# Patient Record
Sex: FEMALE | Race: WHITE | NOT HISPANIC OR LATINO | Employment: OTHER | URBAN - METROPOLITAN AREA
[De-identification: names, ages, dates, MRNs, and addresses within clinical notes are randomized per-mention and may not be internally consistent; named-entity substitution may affect disease eponyms.]

---

## 2020-04-24 ENCOUNTER — LAB REQUISITION (OUTPATIENT)
Dept: LAB | Facility: HOSPITAL | Age: 85
End: 2020-04-24

## 2020-04-24 DIAGNOSIS — U07.1 COVID-19: ICD-10-CM

## 2020-04-24 LAB — SARS-COV-2 RNA RESP QL NAA+PROBE: POSITIVE

## 2020-04-24 PROCEDURE — 87635 SARS-COV-2 COVID-19 AMP PRB: CPT | Performed by: INTERNAL MEDICINE

## 2020-07-13 ENCOUNTER — NURSING HOME VISIT (OUTPATIENT)
Dept: GERIATRICS | Facility: OTHER | Age: 85
End: 2020-07-13
Payer: MEDICARE

## 2020-07-13 VITALS
DIASTOLIC BLOOD PRESSURE: 60 MMHG | WEIGHT: 168 LBS | TEMPERATURE: 98.1 F | HEART RATE: 94 BPM | SYSTOLIC BLOOD PRESSURE: 122 MMHG | OXYGEN SATURATION: 98 % | RESPIRATION RATE: 16 BRPM

## 2020-07-13 DIAGNOSIS — I50.42 CHRONIC COMBINED SYSTOLIC AND DIASTOLIC CONGESTIVE HEART FAILURE (HCC): Primary | ICD-10-CM

## 2020-07-13 DIAGNOSIS — N18.30 STAGE 3 CHRONIC KIDNEY DISEASE (HCC): ICD-10-CM

## 2020-07-13 PROBLEM — K21.9 GERD (GASTROESOPHAGEAL REFLUX DISEASE): Status: ACTIVE | Noted: 2020-07-13

## 2020-07-13 PROBLEM — I10 HTN (HYPERTENSION): Status: ACTIVE | Noted: 2020-07-13

## 2020-07-13 PROBLEM — I89.0 LYMPHEDEMA: Status: ACTIVE | Noted: 2020-07-13

## 2020-07-13 PROCEDURE — 99308 SBSQ NF CARE LOW MDM 20: CPT | Performed by: PHYSICIAN ASSISTANT

## 2020-07-13 RX ORDER — OMEPRAZOLE 20 MG/1
20 CAPSULE, DELAYED RELEASE ORAL DAILY
COMMUNITY

## 2020-07-13 RX ORDER — FUROSEMIDE 40 MG/1
40 TABLET ORAL 2 TIMES DAILY
COMMUNITY
End: 2020-09-23 | Stop reason: HOSPADM

## 2020-07-13 RX ORDER — BUMETANIDE 1 MG/1
1 TABLET ORAL 2 TIMES DAILY
Status: ON HOLD | COMMUNITY
End: 2020-09-23 | Stop reason: SDUPTHER

## 2020-07-13 NOTE — PROGRESS NOTES
[facility]  [address]  [phone]  [place]   Code 32        NAME: Louann Bowers  AGE: 80 y o  SEX: female    DATE OF ENCOUNTER: 7/13/2020     CODE STATUS: FULL CODE       Assessment and Plan     Chronic combined systolic and diastolic congestive heart failure (HCC)  Wt Readings from Last 3 Encounters:   07/13/20 76 2 kg (168 lb)   10/19/16 99 8 kg (220 lb)         - BLE edema 3+  - Continue metoprolol   - Increase furosemide to 60 mg PO BID x 3 days, then return to maintenance dose of 40 mg PO BID    - Add Bumex 1mg PO QD x 3 days then increase to BID  - BMP on 7/14/2020  - BMP on 7/17/2020   - Continue to elevate BLE  - PT not willing to do ACE wraps at this time, states they are uncomfortable  - Continue low sodium diet  - Monitor weights at facility       Stage 3 chronic kidney disease ([facility] )  - Last CMP January of 2020   - BMP on 7/14/2020   - BMP on 7/17/2020   - concern for worsening kidney function while on increased dose of lasix       Plan discussed with Dr Shreya Albarran noted agreement with assessment and plan  All medications and routine orders were reviewed and updated as needed  Chief Complaint     BLE edema -- Nursing request to evaluate    History of Present Illness     BM is an 81 yo CF with multiple medical comorbidities including but not limited to CHF and CKD stage 3 interviewed and examined in collaboration with nursing for worsening BLE edema  Pt with worsening BLE edema  She states her BLE are chronically "big" from lymphedema but the swelling is worsened  She notes medication regimen compliance, compliance with low sodium diet, and compliance with elevating BLE  She refuses ACE wraps to BLE due to being uncomfortable, "especially when they are this big " No other concerns from nursing  Congestive Heart Failure   Presents for follow-up visit  Associated symptoms include edema and orthopnea   Pertinent negatives include no abdominal pain, chest pain, palpitations or shortness of breath  The symptoms have been worsening (BLE edema 3+-4+)  The pain is at a severity of 0/10  Compliance with total regimen is %  Compliance with diet is %  Compliance with medications is %  The patient's allergies, past medical, surgical, social and family history were reviewed and unchanged  Review of Systems     Review of Systems   Constitutional: Positive for activity change  Negative for appetite change, chills and fever  Respiratory: Negative for cough, chest tightness and shortness of breath  Cardiovascular: Positive for leg swelling  Negative for chest pain and palpitations  Gastrointestinal: Negative for abdominal pain  Neurological: Positive for weakness  Psychiatric/Behavioral: Negative for suicidal ideas  Objective     Vitals:   Vitals:    07/13/20 1537   BP: 122/60   Pulse: 94   Resp: 16   Temp: 98 1 °F (36 7 °C)   SpO2: 98%         Physical Exam   Constitutional: No distress  Elderly, chronically ill appearing female resting comfortably in bed with BLE elevated   Eyes:   Wearing glasses   Cardiovascular: Normal rate and regular rhythm  Exam reveals no gallop and no friction rub  No murmur heard  Pulmonary/Chest: Effort normal and breath sounds normal  No stridor  No respiratory distress  She has no wheezes  She has no rales  Musculoskeletal: She exhibits edema (3-4+ pitting edema BLE ) and tenderness ("in my legs from being so big")  Neurological: She is alert  Skin: She is not diaphoretic  Psychiatric:   Pleasant and cooperative during exam    Nursing note and vitals reviewed  Pertinent Laboratory/Diagnostic Studies:  Reviewed in facility chart     Current Medications   Medications reviewed and updated see facility [facility] for details        Current Outpatient Medications:     apixaban (ELIQUIS) 2 5 mg, Take 2 5 mg by mouth 2 (two) times a day, Disp: , Rfl:     bumetanide (BUMEX) 1 mg tablet, Take 1 mg by mouth 2 (two) times a day Take 1 mg QD x 3 days while on increase dose of lasix THEN take 1 tab PO BID indefinitely, Disp: , Rfl:     furosemide (LASIX) 40 mg tablet, Take 40 mg by mouth 2 (two) times a day, Disp: , Rfl:     omeprazole (PriLOSEC) 20 mg delayed release capsule, Take 20 mg by mouth daily, Disp: , Rfl:     metoprolol tartrate (LOPRESSOR) 50 mg tablet, Take 50 mg by mouth 2 (two) times a day, Disp: , Rfl:       Ivonne Grande PA-C  7/13/2020 4:08 PM

## 2020-07-31 ENCOUNTER — NURSING HOME VISIT (OUTPATIENT)
Dept: GERIATRICS | Facility: OTHER | Age: 85
End: 2020-07-31
Payer: MEDICARE

## 2020-07-31 VITALS
HEART RATE: 88 BPM | DIASTOLIC BLOOD PRESSURE: 66 MMHG | SYSTOLIC BLOOD PRESSURE: 122 MMHG | TEMPERATURE: 98.2 F | RESPIRATION RATE: 16 BRPM | WEIGHT: 168.2 LBS | OXYGEN SATURATION: 97 %

## 2020-07-31 DIAGNOSIS — I50.42 CHRONIC COMBINED SYSTOLIC AND DIASTOLIC CONGESTIVE HEART FAILURE (HCC): Primary | ICD-10-CM

## 2020-07-31 PROCEDURE — 99308 SBSQ NF CARE LOW MDM 20: CPT | Performed by: PHYSICIAN ASSISTANT

## 2020-07-31 NOTE — PROGRESS NOTES
[facility]  [address]  [phone]  [place]   Code 32         NAME: Narcisa Gamboa  AGE: 80 y o  SEX: female    DATE OF ENCOUNTER: 7/31/2020    Assessment and Plan     Chronic combined systolic and diastolic congestive heart failure (HCC)  Wt Readings from Last 3 Encounters:   07/13/20 76 2 kg (168 lb)   10/19/16 99 8 kg (220 lb)         - BLE 3+   - Increase lasix to 60mg PO BID x 3 days, then decrease back to maintenance dose of 40 mg PO BID   - Continue Bumex 1mg PO BID   - Continue fluid restriction 1800mL daily   - Continue low sodium diet at facility    -Continue to encourage to elevate BLE as much as possible   - Continue to encourage ACE wraps daily, on in AM, off in PM  - BMP on 8/3/2020        All medications and routine orders were reviewed and updated as needed  Chief Complaint     Nursing request to evaluate for BLE edema    History of Present Illness     BM is an 81 yo CF with multiple medical comorbidities including but not limited to CHF interviewed and examined in collaboration with nursing for worsening BLE edema  Congestive Heart Failure   Presents for follow-up visit  Associated symptoms include edema and orthopnea  Pertinent negatives include no abdominal pain, chest pain, chest pressure or shortness of breath  The symptoms have been worsening  Compliance with total regimen is %  Compliance problems: pt pockets medications if someone does not watch her swallow medication per nursing  Compliance with diet is %  Compliance with exercise: bed bound  The patient's allergies, past medical, surgical, social and family history were reviewed and unchanged  Review of Systems     Review of Systems   Respiratory: Negative for cough and shortness of breath  Cardiovascular: Positive for leg swelling  Negative for chest pain  Gastrointestinal: Negative for abdominal pain  Psychiatric/Behavioral: Negative for suicidal ideas  Objective     Vitals:   Vitals:    07/31/20 1603   BP: 122/66   Pulse: 88   Resp: 16   Temp: 98 2 °F (36 8 °C)   SpO2: 97%         Physical Exam   Constitutional: No distress  Elderly female lying comfortably in bed, BLE slightly elevated on a pillow    Cardiovascular: Normal rate and regular rhythm  Exam reveals no gallop and no friction rub  No murmur heard  Pulmonary/Chest: Effort normal and breath sounds normal  No stridor  No respiratory distress  She has no wheezes  She has no rales  Musculoskeletal: She exhibits edema (3+ BLE )  BLE wrapped in ACE wraps    Neurological: She is alert  Skin: She is not diaphoretic  Psychiatric:   Pleasant and cooperative during exam    Nursing note and vitals reviewed  Pertinent Laboratory/Diagnostic Studies:  0/08/2944  BASIC METABOLIC PNL  GLUCOSE 77 mg/dL 65-99 Final  BUN 40 mg/dL 7-25 H Final  CREATININE 1 47 mg/dL 0 40-1 10 H Final  SODIUM 138 mmol/L 135-145 Final  POTASSIUM 3 4 mmol/L 3 5-5 2 L Final  CHLORIDE 98 mmol/L 100-109 L Final  CARBON DIOXIDE 34 mmol/L 23-31 H Final  CALCIUM 9 2 mg/dL 8 5-10 1 Final  ANION GAP 6 3-11 Final  GFR, CALCULATED 32 >60 L Final    Current Medications   Medications reviewed and updated see facility [facility] for details        Current Outpatient Medications:     apixaban (ELIQUIS) 2 5 mg, Take 2 5 mg by mouth 2 (two) times a day, Disp: , Rfl:     bumetanide (BUMEX) 1 mg tablet, Take 1 mg by mouth 2 (two) times a day Take 1 mg QD x 3 days while on increase dose of lasix THEN take 1 tab PO BID indefinitely, Disp: , Rfl:     furosemide (LASIX) 40 mg tablet, Take 40 mg by mouth 2 (two) times a day, Disp: , Rfl:     metoprolol tartrate (LOPRESSOR) 50 mg tablet, Take 50 mg by mouth 2 (two) times a day, Disp: , Rfl:     omeprazole (PriLOSEC) 20 mg delayed release capsule, Take 20 mg by mouth daily, Disp: , Rfl:       Liam Vale PA-C  7/31/2020 4:03 PM

## 2020-07-31 NOTE — ASSESSMENT & PLAN NOTE
Wt Readings from Last 3 Encounters:   07/13/20 76 2 kg (168 lb)   10/19/16 99 8 kg (220 lb)         - BLE 3+   - Increase lasix to 60mg PO BID x 3 days, then decrease back to maintenance dose of 40 mg PO BID   - Continue Bumex 1mg PO BID   - Continue fluid restriction 1800mL daily   - Continue low sodium diet at facility    -Continue to encourage to elevate BLE as much as possible   - Continue to encourage ACE wraps daily, on in AM, off in PM  - BMP on 8/3/2020

## 2020-09-03 ENCOUNTER — NURSING HOME VISIT (OUTPATIENT)
Dept: GERIATRICS | Facility: OTHER | Age: 85
End: 2020-09-03
Payer: COMMERCIAL

## 2020-09-03 DIAGNOSIS — I89.0 LYMPHEDEMA: ICD-10-CM

## 2020-09-03 DIAGNOSIS — I50.42 CHRONIC COMBINED SYSTOLIC AND DIASTOLIC CONGESTIVE HEART FAILURE (HCC): Primary | ICD-10-CM

## 2020-09-03 DIAGNOSIS — I48.91 ATRIAL FIBRILLATION, UNSPECIFIED TYPE (HCC): ICD-10-CM

## 2020-09-03 DIAGNOSIS — I10 HYPERTENSION, UNSPECIFIED TYPE: ICD-10-CM

## 2020-09-03 DIAGNOSIS — N18.30 STAGE 3 CHRONIC KIDNEY DISEASE (HCC): ICD-10-CM

## 2020-09-03 PROCEDURE — 99309 SBSQ NF CARE MODERATE MDM 30: CPT | Performed by: NURSE PRACTITIONER

## 2020-09-03 NOTE — ASSESSMENT & PLAN NOTE
Wt Readings from Last 3 Encounters:   07/31/20 76 3 kg (168 lb 3 2 oz)   07/13/20 76 2 kg (168 lb)   10/19/16 99 8 kg (220 lb)     Weight loss: 5lbs x 1 month  Current weight: 74 18 kgs (8/12/2020)  No cardiopulmonary symptoms  Chronic lymphedema  Renal functions trending up (9/2/2020)  Continue monthly weight  Continue the following meds:  * Metolazone 2 5mg weekly  * Bumex 2mg BID  * Metoprolol tartrate 50mg BID  Continue BMP as scheduled  Continue oral fluid restriction: 1,800ml/day  Continue low sodium, cardiac diet

## 2020-09-03 NOTE — ASSESSMENT & PLAN NOTE
Patient non-compliant to compression wrap  Per nursing, at baseline  Doppler study done on 8/25/2020: Negative  XRAY done: Negative (please see labs/imaging section)  Nursing to continue to monitor and elevate foot of bed at all times

## 2020-09-03 NOTE — PROGRESS NOTES
Progress Note    Location: [place]  POS: 32 ([facility])    Assessment/Plan:    Chronic combined systolic and diastolic congestive heart failure (HCC)  Wt Readings from Last 3 Encounters:   07/31/20 76 3 kg (168 lb 3 2 oz)   07/13/20 76 2 kg (168 lb)   10/19/16 99 8 kg (220 lb)     Weight loss: 5lbs x 1 month  Current weight: 74 18 kgs (8/12/2020)  No cardiopulmonary symptoms  Chronic lymphedema  Renal functions trending up (9/2/2020)  Continue monthly weight  Continue the following meds:  * Metolazone 2 5mg weekly  * Bumex 2mg BID  * Metoprolol tartrate 50mg BID  Continue BMP as scheduled  Continue oral fluid restriction: 1,800ml/day  Continue low sodium, cardiac diet  Stage 3 chronic kidney disease (HCC)  Trending up renal functions (BUN, Crea and lower GFR)  On diuretics  On fluid restrictions: 1,800 ml/day  Followed by Nephrology office  Continue to serially monitor BMP as scheduled  HTN (hypertension)  Stable and controlled  On weekly BP and HR checks only  BP range (8/4 - 25/2020) = 110/51 to 132/70  Trending up renal functions (9/2/2020)  Continue the following meds:  * Metolazone 2 5mg weekly  * Bumex 2mg BID  * Metoprolol tartrate 50mg BID  Continue to serially monitor BMP as scheduled  Lymphedema  Patient non-compliant to compression wrap  Per nursing, at baseline  Doppler study done on 8/25/2020: Negative  XRAY done: Negative (please see labs/imaging section)  Nursing to continue to monitor and elevate foot of bed at all times  Atrial fibrillation ([address] )  Per [facility] epic note, 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)  On long term anticoagulation (since 2018)  BP and HR stable and controlled  Continue Apixaban 2 5mg Q12 hours      Chief complaint / Reason for visit: Follow-up visit    History of Present Illness: This is an 80 y o  Female patient admitted at [facility] for debility   Patient is seen and examined today to follow-up acute and chronic medical conditions: HTN, Chronic Combined CHF, CKD3, GERD and Lymphedema and Atrial Fibrillation    Patient is in bed for this visit - alert, cooperative and not in distress  Verbal with coherent speech - oriented to name/birthday/ place and year only but able to recall days of the week (forward/backward) and count 1-20 without problems or need for cuing  Noted significant hearing impairment  Patient denies any acute medical concerns for this visit - including new or worsening pain, chest pain, SOB, fever, chills, headache, malaise/fatigue, dizziness/ vertigo, cough/colds symptoms or appetite/sleep concerns  Per nursing, patient's leg looks increasing in size for the last 3 days  Patient declines compression wraps and ended up not using it all together  On examination, noted large ecchymotic area to left calf and a small one on Left dorsum of foot - patient reported pain when left foot discoloration is gently assessed  on this visit - per nursing, the discoloration was already observed when she got transferred to current room 3 days ago  Otherwise, the rest of system assessment unremarkable  Presented to be medically stable on this visit  V/S: T97 3F -P80 -R18 BP: 138/64 SpO2: 96% RA  Review of Systems:  Per history of present illness, all other systems reviewed and negative  HISTORY:  Medical Hx: Reviewed, unchanged  Family Hx: Reviewed, unchanged  Soc Hx: Reviewed,  Unchanged    ALLERGY: No Known Allergies    PHYSICAL EXAM:  Vital Signs:  T97 0F -P78 -R17 BP: 128/67 SpO2: 96% RA  Weight: 163 2 lbs (8/12/2020) <= 168 2 lbs (7/29/2020) <= 150 0 lbs (6/1/2020)      Physical Exam  Vitals signs and nursing note reviewed  Constitutional:       General: She is not in acute distress  Appearance: Normal appearance  She is normal weight  She is not ill-appearing, toxic-appearing or diaphoretic  HENT:      Head: Normocephalic and atraumatic  Right Ear: Tympanic membrane, ear canal and external ear normal  There is no impacted cerumen  Left Ear: Tympanic membrane, ear canal and external ear normal  There is no impacted cerumen  Nose: Nose normal  No congestion or rhinorrhea  Mouth/Throat:      Mouth: Mucous membranes are moist       Pharynx: Oropharynx is clear  No oropharyngeal exudate or posterior oropharyngeal erythema  Eyes:      General: No scleral icterus  Right eye: No discharge  Left eye: No discharge  Conjunctiva/sclera: Conjunctivae normal       Pupils: Pupils are equal, round, and reactive to light  Comments: Wears glasses  Neck:      Musculoskeletal: Neck supple  No neck rigidity or muscular tenderness  Vascular: No carotid bruit  Cardiovascular:      Rate and Rhythm: Normal rate and regular rhythm  Heart sounds: Normal heart sounds  No murmur  No friction rub  No gallop  Pulmonary:      Effort: Pulmonary effort is normal  No respiratory distress  Breath sounds: Normal breath sounds  No stridor  No wheezing, rhonchi or rales  Chest:      Chest wall: No tenderness  Abdominal:      General: Bowel sounds are normal  There is no distension  Palpations: Abdomen is soft  There is no mass  Tenderness: There is no abdominal tenderness  There is no right CVA tenderness, left CVA tenderness, guarding or rebound  Hernia: No hernia is present  Genitourinary:     Comments: Non distended bladder  Musculoskeletal:         General: Swelling and tenderness present  No deformity or signs of injury  Right lower leg: Edema present  Left lower leg: Edema present  Comments: Non ambulatory - bed bound  Large ecchymotic area to left calf and a small one on Left dorsum of foot - patient reported pain when left foot discoloration is gently assessed    Lymphadenopathy:      Cervical: No cervical adenopathy  Skin:     General: Skin is warm  Coloration: Skin is not jaundiced or pale  Findings: Bruising present  No erythema, lesion or rash        Comments: Large ecchymotic (purplish in color) area to Left calf; Ecchymotic (light green) area  Neurological:      Mental Status: She is alert  Mental status is at baseline  Comments: Verbal with clear coherent speech  Oriented to name/birthday/ place and year only but able to recall days of the week (forward/backward) and count 1-20 without problems or need for cuing  Psychiatric:         Mood and Affect: Mood normal          Behavior: Behavior normal        Laboratory results / Imaging reviewed: Hard copies in medical chart:    * BMP (9/2/2020) = WNL except:  BUN: 53 (H)  Crea: 1 71 (H)  K: 3 4 (L)  Cl: 95 (L)  Co: 38  GFR: 26 (L)    * XRAY (8/25/2020)  Left foot: soft tissue swelling  No fracture or dislocation  Osteopenic  Left Tibia/Fibula: No fracture  Left knee: No fracture  DJD  Osteopenia      Current Medications:   All medications reviewed and updated in [address] [place] [address] [id number]  9/3/2020

## 2020-09-03 NOTE — ASSESSMENT & PLAN NOTE
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

## 2020-09-03 NOTE — ASSESSMENT & PLAN NOTE
Stable and controlled  On weekly BP and HR checks only  BP range (8/4 - 25/2020) = 110/51 to 132/70  Trending up renal functions (9/2/2020)  Continue the following meds:  * Metolazone 2 5mg weekly  * Bumex 2mg BID  * Metoprolol tartrate 50mg BID  Continue to serially monitor BMP as scheduled

## 2020-09-03 NOTE — ASSESSMENT & PLAN NOTE
Trending up renal functions (BUN, Crea and lower GFR)  On diuretics  On fluid restrictions: 1,800 ml/day  Followed by Nephrology office  Continue to serially monitor BMP as scheduled

## 2020-09-09 ENCOUNTER — TELEPHONE (OUTPATIENT)
Dept: OTHER | Facility: OTHER | Age: 85
End: 2020-09-09

## 2020-09-09 NOTE — TELEPHONE ENCOUNTER
Petty @ [facility] [phone]/YH: Glory Mcburney 6/18/1932/Lab Results

## 2020-09-20 ENCOUNTER — TELEPHONE (OUTPATIENT)
Dept: OTHER | Facility: OTHER | Age: 85
End: 2020-09-20

## 2020-09-20 NOTE — TELEPHONE ENCOUNTER
[phone]/CORINNE/[facility] Heather/Mary Anne Manley/[birth year]/Questions concerning residents renal disease

## 2020-09-21 ENCOUNTER — APPOINTMENT (EMERGENCY)
Dept: RADIOLOGY | Facility: HOSPITAL | Age: 85
DRG: 683 | End: 2020-09-21
Payer: COMMERCIAL

## 2020-09-21 ENCOUNTER — HOSPITAL ENCOUNTER (INPATIENT)
Facility: HOSPITAL | Age: 85
LOS: 2 days | Discharge: NURSING, REHAB OR LONG-TERM CARE | DRG: 683 | End: 2020-09-23
Attending: EMERGENCY MEDICINE | Admitting: INTERNAL MEDICINE
Payer: COMMERCIAL

## 2020-09-21 DIAGNOSIS — N17.9 ACUTE KIDNEY INJURY SUPERIMPOSED ON CHRONIC KIDNEY DISEASE (HCC): ICD-10-CM

## 2020-09-21 DIAGNOSIS — N17.9 ACUTE RENAL FAILURE SUPERIMPOSED ON CHRONIC KIDNEY DISEASE, UNSPECIFIED CKD STAGE, UNSPECIFIED ACUTE RENAL FAILURE TYPE (HCC): ICD-10-CM

## 2020-09-21 DIAGNOSIS — F03.90 DEMENTIA (HCC): ICD-10-CM

## 2020-09-21 DIAGNOSIS — D63.1 ANEMIA DUE TO CHRONIC KIDNEY DISEASE: ICD-10-CM

## 2020-09-21 DIAGNOSIS — N18.9 ACUTE RENAL FAILURE SUPERIMPOSED ON CHRONIC KIDNEY DISEASE, UNSPECIFIED CKD STAGE, UNSPECIFIED ACUTE RENAL FAILURE TYPE (HCC): ICD-10-CM

## 2020-09-21 DIAGNOSIS — N18.9 ACUTE KIDNEY INJURY SUPERIMPOSED ON CHRONIC KIDNEY DISEASE (HCC): ICD-10-CM

## 2020-09-21 DIAGNOSIS — N18.9 ANEMIA DUE TO CHRONIC KIDNEY DISEASE: ICD-10-CM

## 2020-09-21 DIAGNOSIS — R53.1 GENERALIZED WEAKNESS: Primary | ICD-10-CM

## 2020-09-21 DIAGNOSIS — E46 MALNUTRITION, UNSPECIFIED TYPE (HCC): ICD-10-CM

## 2020-09-21 DIAGNOSIS — I50.42 CHRONIC COMBINED SYSTOLIC AND DIASTOLIC CONGESTIVE HEART FAILURE (HCC): Chronic | ICD-10-CM

## 2020-09-21 PROBLEM — I50.9 CONGESTIVE HEART FAILURE (CHF) (HCC): Status: ACTIVE | Noted: 2020-09-21

## 2020-09-21 PROBLEM — G93.41 ACUTE METABOLIC ENCEPHALOPATHY: Status: ACTIVE | Noted: 2020-09-21

## 2020-09-21 PROBLEM — E83.52 HYPERCALCEMIA: Status: ACTIVE | Noted: 2020-09-21

## 2020-09-21 PROBLEM — E87.1 HYPONATREMIA: Status: ACTIVE | Noted: 2020-09-21

## 2020-09-21 LAB
ALBUMIN SERPL BCP-MCNC: 2.5 G/DL (ref 3.5–5)
ALP SERPL-CCNC: 157 U/L (ref 46–116)
ALT SERPL W P-5'-P-CCNC: 16 U/L (ref 12–78)
ANION GAP SERPL CALCULATED.3IONS-SCNC: 10 MMOL/L (ref 4–13)
APTT PPP: 39 SECONDS (ref 23–37)
AST SERPL W P-5'-P-CCNC: 55 U/L (ref 5–45)
ATRIAL RATE: 153 BPM
BACTERIA UR QL AUTO: ABNORMAL /HPF
BASOPHILS # BLD AUTO: 0.02 THOUSANDS/ΜL (ref 0–0.1)
BASOPHILS NFR BLD AUTO: 0 % (ref 0–1)
BILIRUB SERPL-MCNC: 0.66 MG/DL (ref 0.2–1)
BILIRUB UR QL STRIP: NEGATIVE
BUN SERPL-MCNC: 96 MG/DL (ref 5–25)
CALCIUM ALBUM COR SERPL-MCNC: 10.6 MG/DL (ref 8.3–10.1)
CALCIUM SERPL-MCNC: 9.4 MG/DL (ref 8.3–10.1)
CHLORIDE SERPL-SCNC: 92 MMOL/L (ref 100–108)
CLARITY UR: CLEAR
CO2 SERPL-SCNC: 31 MMOL/L (ref 21–32)
COLOR UR: YELLOW
CREAT SERPL-MCNC: 2.38 MG/DL (ref 0.6–1.3)
CREAT UR-MCNC: 38 MG/DL
EOSINOPHIL # BLD AUTO: 0.14 THOUSAND/ΜL (ref 0–0.61)
EOSINOPHIL NFR BLD AUTO: 2 % (ref 0–6)
ERYTHROCYTE [DISTWIDTH] IN BLOOD BY AUTOMATED COUNT: 19.4 % (ref 11.6–15.1)
GFR SERPL CREATININE-BSD FRML MDRD: 18 ML/MIN/1.73SQ M
GLUCOSE SERPL-MCNC: 83 MG/DL (ref 65–140)
GLUCOSE UR STRIP-MCNC: NEGATIVE MG/DL
HCT VFR BLD AUTO: 31.1 % (ref 34.8–46.1)
HGB BLD-MCNC: 9.7 G/DL (ref 11.5–15.4)
HGB UR QL STRIP.AUTO: ABNORMAL
IMM GRANULOCYTES # BLD AUTO: 0.01 THOUSAND/UL (ref 0–0.2)
IMM GRANULOCYTES NFR BLD AUTO: 0 % (ref 0–2)
INR PPP: 1.97 (ref 0.84–1.19)
KETONES UR STRIP-MCNC: NEGATIVE MG/DL
LACTATE SERPL-SCNC: 1.2 MMOL/L (ref 0.5–2)
LEUKOCYTE ESTERASE UR QL STRIP: ABNORMAL
LYMPHOCYTES # BLD AUTO: 2.23 THOUSANDS/ΜL (ref 0.6–4.47)
LYMPHOCYTES NFR BLD AUTO: 29 % (ref 14–44)
MCH RBC QN AUTO: 22.7 PG (ref 26.8–34.3)
MCHC RBC AUTO-ENTMCNC: 31.2 G/DL (ref 31.4–37.4)
MCV RBC AUTO: 73 FL (ref 82–98)
MONOCYTES # BLD AUTO: 0.8 THOUSAND/ΜL (ref 0.17–1.22)
MONOCYTES NFR BLD AUTO: 11 % (ref 4–12)
NEUTROPHILS # BLD AUTO: 4.43 THOUSANDS/ΜL (ref 1.85–7.62)
NEUTS SEG NFR BLD AUTO: 58 % (ref 43–75)
NITRITE UR QL STRIP: POSITIVE
NON-SQ EPI CELLS URNS QL MICRO: ABNORMAL /HPF
NRBC BLD AUTO-RTO: 0 /100 WBCS
NT-PROBNP SERPL-MCNC: ABNORMAL PG/ML
PH UR STRIP.AUTO: 5.5 [PH]
PLATELET # BLD AUTO: 403 THOUSANDS/UL (ref 149–390)
PMV BLD AUTO: 10.6 FL (ref 8.9–12.7)
POTASSIUM SERPL-SCNC: 3.6 MMOL/L (ref 3.5–5.3)
PROT SERPL-MCNC: 6.2 G/DL (ref 6.4–8.2)
PROT UR STRIP-MCNC: NEGATIVE MG/DL
PROTHROMBIN TIME: 22.5 SECONDS (ref 11.6–14.5)
QRS AXIS: 43 DEGREES
QRSD INTERVAL: 94 MS
QT INTERVAL: 344 MS
QTC INTERVAL: 411 MS
RBC # BLD AUTO: 4.28 MILLION/UL (ref 3.81–5.12)
RBC #/AREA URNS AUTO: ABNORMAL /HPF
SODIUM 24H UR-SCNC: 40 MOL/L
SODIUM SERPL-SCNC: 133 MMOL/L (ref 136–145)
SP GR UR STRIP.AUTO: 1.01 (ref 1–1.03)
T WAVE AXIS: 158 DEGREES
TROPONIN I SERPL-MCNC: <0.02 NG/ML
UROBILINOGEN UR QL STRIP.AUTO: 0.2 E.U./DL
VENTRICULAR RATE: 86 BPM
WBC # BLD AUTO: 7.63 THOUSAND/UL (ref 4.31–10.16)
WBC #/AREA URNS AUTO: ABNORMAL /HPF

## 2020-09-21 PROCEDURE — 99223 1ST HOSP IP/OBS HIGH 75: CPT | Performed by: INTERNAL MEDICINE

## 2020-09-21 PROCEDURE — 83605 ASSAY OF LACTIC ACID: CPT | Performed by: EMERGENCY MEDICINE

## 2020-09-21 PROCEDURE — 80053 COMPREHEN METABOLIC PANEL: CPT | Performed by: EMERGENCY MEDICINE

## 2020-09-21 PROCEDURE — 87635 SARS-COV-2 COVID-19 AMP PRB: CPT | Performed by: PHYSICIAN ASSISTANT

## 2020-09-21 PROCEDURE — 99284 EMERGENCY DEPT VISIT MOD MDM: CPT | Performed by: EMERGENCY MEDICINE

## 2020-09-21 PROCEDURE — 84300 ASSAY OF URINE SODIUM: CPT | Performed by: INTERNAL MEDICINE

## 2020-09-21 PROCEDURE — 93005 ELECTROCARDIOGRAM TRACING: CPT

## 2020-09-21 PROCEDURE — 85730 THROMBOPLASTIN TIME PARTIAL: CPT | Performed by: EMERGENCY MEDICINE

## 2020-09-21 PROCEDURE — 71045 X-RAY EXAM CHEST 1 VIEW: CPT

## 2020-09-21 PROCEDURE — 85610 PROTHROMBIN TIME: CPT | Performed by: EMERGENCY MEDICINE

## 2020-09-21 PROCEDURE — 82570 ASSAY OF URINE CREATININE: CPT | Performed by: INTERNAL MEDICINE

## 2020-09-21 PROCEDURE — 83880 ASSAY OF NATRIURETIC PEPTIDE: CPT | Performed by: INTERNAL MEDICINE

## 2020-09-21 PROCEDURE — 85025 COMPLETE CBC W/AUTO DIFF WBC: CPT | Performed by: EMERGENCY MEDICINE

## 2020-09-21 PROCEDURE — 84484 ASSAY OF TROPONIN QUANT: CPT | Performed by: EMERGENCY MEDICINE

## 2020-09-21 PROCEDURE — 81001 URINALYSIS AUTO W/SCOPE: CPT | Performed by: INTERNAL MEDICINE

## 2020-09-21 PROCEDURE — 93010 ELECTROCARDIOGRAM REPORT: CPT | Performed by: INTERNAL MEDICINE

## 2020-09-21 PROCEDURE — 99285 EMERGENCY DEPT VISIT HI MDM: CPT

## 2020-09-21 PROCEDURE — 36415 COLL VENOUS BLD VENIPUNCTURE: CPT | Performed by: EMERGENCY MEDICINE

## 2020-09-21 RX ORDER — PANTOPRAZOLE SODIUM 40 MG/1
40 TABLET, DELAYED RELEASE ORAL
Status: DISCONTINUED | OUTPATIENT
Start: 2020-09-22 | End: 2020-09-23 | Stop reason: HOSPADM

## 2020-09-21 RX ORDER — METOPROLOL TARTRATE 50 MG/1
50 TABLET, FILM COATED ORAL 2 TIMES DAILY
Status: DISCONTINUED | OUTPATIENT
Start: 2020-09-21 | End: 2020-09-23 | Stop reason: HOSPADM

## 2020-09-21 RX ORDER — FUROSEMIDE 10 MG/ML
40 INJECTION INTRAMUSCULAR; INTRAVENOUS
Status: DISCONTINUED | OUTPATIENT
Start: 2020-09-22 | End: 2020-09-22

## 2020-09-21 RX ADMIN — METOPROLOL TARTRATE 50 MG: 50 TABLET, FILM COATED ORAL at 21:18

## 2020-09-21 RX ADMIN — APIXABAN 2.5 MG: 2.5 TABLET, FILM COATED ORAL at 21:18

## 2020-09-21 NOTE — ASSESSMENT & PLAN NOTE
Patient has baseline anemia from 8 1-10 0 over the past few months  Currently anemia is baseline at 9 7      Trend through BMP AM draw  Recent Labs     09/21/20  1718   HGB 9 7*

## 2020-09-21 NOTE — ED PROVIDER NOTES
History  Chief Complaint   Patient presents with    Fatigue     pt presents via ambulance from [facility] for renal failure and increased fatigue, per EMS pt has been receiving fluids throughout the day      HPI    [de-identified] female presents emergency department with report fatigue  She comes from [facility] with a report that she is in renal failure and anemia given her IV fluids  Patient does not really know why she is here and denies any symptoms other than being tired and fatigued  She states she has chronic leg swelling  Prior to Admission Medications   Prescriptions Last Dose Informant Patient Reported? Taking? apixaban (ELIQUIS) 2 5 mg   Yes Yes   Sig: Take 2 5 mg by mouth 2 (two) times a day   bumetanide (BUMEX) 1 mg tablet   Yes No   Sig: Take 1 mg by mouth 2 (two) times a day Take 1 mg QD x 3 days while on increase dose of lasix THEN take 1 tab PO BID indefinitely   furosemide (LASIX) 40 mg tablet   Yes Yes   Sig: Take 40 mg by mouth 2 (two) times a day   metoprolol tartrate (LOPRESSOR) 50 mg tablet   Yes Yes   Sig: Take 50 mg by mouth 2 (two) times a day   omeprazole (PriLOSEC) 20 mg delayed release capsule   Yes No   Sig: Take 20 mg by mouth daily      Facility-Administered Medications: None       Past Medical History:   Diagnosis Date    Chronic kidney disease     GERD (gastroesophageal reflux disease)     Hyperlipidemia     Hypertension     S/P coronary artery bypass graft x 5     Stroke [facility])        Past Surgical History:   Procedure Laterality Date    FRACTURE SURGERY         Family History   Family history unknown: Yes     I have reviewed and agree with the history as documented  E-Cigarette/Vaping     E-Cigarette/Vaping Substances     Social History     Tobacco Use    Smoking status: Never Smoker    Smokeless tobacco: Never Used   Substance Use Topics    Alcohol use: No    Drug use: No       Review of Systems   Constitutional: Positive for fatigue  Negative for fever  Respiratory: Negative for cough and shortness of breath  Cardiovascular: Positive for leg swelling  Negative for chest pain  Gastrointestinal: Negative for abdominal distention, constipation, diarrhea, nausea and vomiting  Genitourinary:        Chronic incontinence with use of diaper   Musculoskeletal: Negative  Neurological: Negative  Physical Exam  Physical Exam  Vitals signs and nursing note reviewed  Constitutional:       Appearance: Normal appearance  HENT:      Head: Normocephalic and atraumatic  Nose: Nose normal       Mouth/Throat:      Mouth: Mucous membranes are moist    Eyes:      Extraocular Movements: Extraocular movements intact  Conjunctiva/sclera: Conjunctivae normal       Pupils: Pupils are equal, round, and reactive to light  Neck:      Musculoskeletal: Normal range of motion  Cardiovascular:      Rate and Rhythm: Normal rate  Rhythm irregular  Pulses: Normal pulses  Pulmonary:      Effort: Pulmonary effort is normal       Breath sounds: Normal breath sounds  Abdominal:      General: There is no distension  Palpations: Abdomen is soft  Musculoskeletal:      Right lower leg: Edema present  Left lower leg: Edema present  Skin:     General: Skin is warm and dry  Neurological:      General: No focal deficit present  Mental Status: She is alert and oriented to person, place, and time        Comments: Disoriented to situation   Psychiatric:         Behavior: Behavior normal          Vital Signs  ED Triage Vitals   Temperature Pulse Respirations Blood Pressure SpO2   09/21/20 1656 09/21/20 1656 09/21/20 1656 09/21/20 1656 09/21/20 1656   97 5 °F (36 4 °C) 84 20 128/73 100 %      Temp Source Heart Rate Source Patient Position - Orthostatic VS BP Location FiO2 (%)   09/21/20 1656 09/21/20 1656 09/21/20 1920 09/21/20 1920 --   Oral Monitor Lying Right arm       Pain Score       09/21/20 1920       No Pain           Vitals:    09/22/20 1303 09/22/20 1514 09/22/20 2119 09/22/20 2159   BP: 130/80 110/62 105/54 105/54   Pulse: 80 85  92   Patient Position - Orthostatic VS: Lying Lying  Lying         Visual Acuity      ED Medications  Medications   apixaban (ELIQUIS) tablet 2 5 mg (2 5 mg Oral Given 9/22/20 1755)   metoprolol tartrate (LOPRESSOR) tablet 50 mg (50 mg Oral Not Given 9/22/20 2119)   pantoprazole (PROTONIX) EC tablet 40 mg (40 mg Oral Given 9/22/20 0637)   loperamide (IMODIUM) capsule 2 mg (0 mg Oral Hold 9/22/20 2121)   multi-electrolyte (ISOLYTE-S PH 7 4) bolus 500 mL (500 mL Intravenous New Bag 9/22/20 1755)       Diagnostic Studies  Results Reviewed     Procedure Component Value Units Date/Time    Basic metabolic panel [[id number]]  (Abnormal) Collected:  09/22/20 0435    Lab Status:  Final result Specimen:  Blood from Arm, Right Updated:  09/22/20 0606     Sodium 134 mmol/L      Potassium 3 6 mmol/L      Chloride 94 mmol/L      CO2 32 mmol/L      ANION GAP 8 mmol/L       mg/dL      Creatinine 2 38 mg/dL      Glucose 72 mg/dL      Calcium 9 8 mg/dL      eGFR 18 ml/min/1 73sq m     Narrative:       [place] guidelines for Chronic Kidney Disease (CKD):     Stage 1 with normal or high GFR (GFR > 90 mL/min/1 73 square meters)    Stage 2 Mild CKD (GFR = 60-89 mL/min/1 73 square meters)    Stage 3A Moderate CKD (GFR = 45-59 mL/min/1 73 square meters)    Stage 3B Moderate CKD (GFR = 30-44 mL/min/1 73 square meters)    Stage 4 Severe CKD (GFR = 15-29 mL/min/1 73 square meters)    Stage 5 End Stage CKD (GFR <15 mL/min/1 73 square meters)  Note: GFR calculation is accurate only with a steady state creatinine    CBC and differential [[id number]]  (Abnormal) Collected:  09/22/20 0435    Lab Status:  Final result Specimen:  Blood from Arm, Right Updated:  09/22/20 0526     WBC 6 75 Thousand/uL      RBC 4 19 Million/uL      Hemoglobin 9 3 g/dL      Hematocrit 30 7 %      MCV 73 fL      MCH 22 2 pg      MCHC 30 3 g/dL RDW 19 8 %      MPV 10 7 fL      Platelets 313 Thousands/uL      nRBC 0 /100 WBCs      Neutrophils Relative 61 %      Immat GRANS % 0 %      Lymphocytes Relative 27 %      Monocytes Relative 10 %      Eosinophils Relative 2 %      Basophils Relative 0 %      Neutrophils Absolute 4 17 Thousands/µL      Immature Grans Absolute 0 01 Thousand/uL      Lymphocytes Absolute 1 80 Thousands/µL      Monocytes Absolute 0 65 Thousand/µL      Eosinophils Absolute 0 10 Thousand/µL      Basophils Absolute 0 02 Thousands/µL     Novel Coronavirus (Covid-19),PCR [facility] [[id number]]  (Normal) Collected:  09/21/20 2218    Lab Status:  Final result Specimen:  Nares from Nose Updated:  09/22/20 0002     SARS-CoV-2 Negative    Narrative: The specimen collection materials, transport medium, and/or testing methodology utilized in the production of these test results have been proven to be reliable in a limited validation with an abbreviated program under the Emergency Utilization Authorization provided by the FDA  Testing reported as "Presumptive positive" will be confirmed with secondary testing with a reference laboratory to ensure result accuracy  Clinical caution and judgement should be used with the interpretation of these results with consideration of the clinical impression and other laboratory testing  Testing reported as "Positive" or "Negative" has been proven to be accurate according to standard laboratory validation requirements  All testing is performed with control materials showing appropriate reactivity at standard intervals        Urine Microscopic [[id number]]  (Abnormal) Collected:  09/21/20 2047    Lab Status:  Final result Specimen:  Urine, Indwelling Mejias Catheter Updated:  09/21/20 2155     RBC, UA 1-2 /hpf      WBC, UA 4-10 /hpf      Epithelial Cells Occasional /hpf      Bacteria, UA Occasional /hpf     NT-BNP PRO [[id number]]  (Abnormal) Collected:  09/21/20 1718    Lab Status:  Final result Specimen: Blood from Arm, Left Updated:  09/21/20 2116     NT-proBNP 14,621 pg/mL     UA w Reflex to Microscopic w Reflex to Culture [[id number]]  (Abnormal) Collected:  09/21/20 2047    Lab Status:  Final result Specimen:  Urine, Indwelling Mejias Catheter Updated:  09/21/20 2114     Color, UA Yellow     Clarity, UA Clear     Specific Gravity, UA 1 010     pH, UA 5 5     Leukocytes, UA Trace     Nitrite, UA Positive     Protein, UA Negative mg/dl      Glucose, UA Negative mg/dl      Ketones, UA Negative mg/dl      Urobilinogen, UA 0 2 E U /dl      Bilirubin, UA Negative     Blood, UA Moderate    Sodium, urine, random [[id number]] Collected:  09/21/20 2047    Lab Status:  Final result Specimen:  Urine, Catheter Updated:  09/21/20 2113     Sodium, Ur 40    Creatinine, urine, random [[id number]] Collected:  09/21/20 2047    Lab Status:  Final result Specimen:  Urine, Catheter Updated:  09/21/20 2113     Creatinine, Ur 38 0 mg/dL     Lactic acid [[id number]]  (Normal) Collected:  09/21/20 1718    Lab Status:  Final result Specimen:  Blood from Arm, Left Updated:  09/21/20 1745     LACTIC ACID 1 2 mmol/L     Narrative:       Result may be elevated if tourniquet was used during collection      Troponin I [[id number]]  (Normal) Collected:  09/21/20 1718    Lab Status:  Final result Specimen:  Blood from Arm, Left Updated:  09/21/20 1745     Troponin I <0 02 ng/mL     Comprehensive metabolic panel [[id number]]  (Abnormal) Collected:  09/21/20 1718    Lab Status:  Final result Specimen:  Blood from Arm, Left Updated:  09/21/20 1743     Sodium 133 mmol/L      Potassium 3 6 mmol/L      Chloride 92 mmol/L      CO2 31 mmol/L      ANION GAP 10 mmol/L      BUN 96 mg/dL      Creatinine 2 38 mg/dL      Glucose 83 mg/dL      Calcium 9 4 mg/dL      Corrected Calcium 10 6 mg/dL      AST 55 U/L      ALT 16 U/L      Alkaline Phosphatase 157 U/L      Total Protein 6 2 g/dL      Albumin 2 5 g/dL      Total Bilirubin 0 66 mg/dL      eGFR 18 ml/min/1 73sq m Narrative:       National Kidney Disease Foundation guidelines for Chronic Kidney Disease (CKD):     Stage 1 with normal or high GFR (GFR > 90 mL/min/1 73 square meters)    Stage 2 Mild CKD (GFR = 60-89 mL/min/1 73 square meters)    Stage 3A Moderate CKD (GFR = 45-59 mL/min/1 73 square meters)    Stage 3B Moderate CKD (GFR = 30-44 mL/min/1 73 square meters)    Stage 4 Severe CKD (GFR = 15-29 mL/min/1 73 square meters)    Stage 5 End Stage CKD (GFR <15 mL/min/1 73 square meters)  Note: GFR calculation is accurate only with a steady state creatinine    Protime-INR [[id number]]  (Abnormal) Collected:  09/21/20 1718    Lab Status:  Final result Specimen:  Blood from Arm, Left Updated:  09/21/20 1742     Protime 22 5 seconds      INR 1 97    APTT [[id number]]  (Abnormal) Collected:  09/21/20 1718    Lab Status:  Final result Specimen:  Blood from Arm, Left Updated:  09/21/20 1742     PTT 39 seconds     CBC and differential [[id number]]  (Abnormal) Collected:  09/21/20 1718    Lab Status:  Final result Specimen:  Blood from Arm, Left Updated:  09/21/20 1727     WBC 7 63 Thousand/uL      RBC 4 28 Million/uL      Hemoglobin 9 7 g/dL      Hematocrit 31 1 %      MCV 73 fL      MCH 22 7 pg      MCHC 31 2 g/dL      RDW 19 4 %      MPV 10 6 fL      Platelets 362 Thousands/uL      nRBC 0 /100 WBCs      Neutrophils Relative 58 %      Immat GRANS % 0 %      Lymphocytes Relative 29 %      Monocytes Relative 11 %      Eosinophils Relative 2 %      Basophils Relative 0 %      Neutrophils Absolute 4 43 Thousands/µL      Immature Grans Absolute 0 01 Thousand/uL      Lymphocytes Absolute 2 23 Thousands/µL      Monocytes Absolute 0 80 Thousand/µL      Eosinophils Absolute 0 14 Thousand/µL      Basophils Absolute 0 02 Thousands/µL                  XR chest 1 view portable   Final Result by Lida Romero MD (09/21 1804)      No acute cardiopulmonary disease              Workstation performed: [id number] Procedures  ECG 12 Lead Documentation Only    Date/Time: 9/21/2020 5:46 PM  Performed by: Jimmie Canales DO  Authorized by: Jimmie Canales DO     Indications / Diagnosis:  Weakness   Patient location:  ED  Previous ECG:     Previous ECG:  Unavailable  Interpretation:     Interpretation: abnormal    Rate:     ECG rate assessment: normal    Rhythm:     Rhythm: atrial fibrillation    QRS:     QRS axis:  Normal  ST segments:     ST segments:  Non-specific  T waves:     T waves: non-specific               ED Course  ED Course as of Sep 22 2215   Mon Sep 21, 2020   [id number] Daughters arrived, 1 of which is power of , Carole Blake  They state that her advanced directive is inaccurate and that although she is a DNR, medical treatments are requested, including IV fluids, antibiotics, dialysis or other treatments besides intubation and CPR  They are requesting admission for further care of her progressive renal failure  The C she has not urinated in the last 2 days  1851 Patient now states she would not want dialysis       1904 Patient accepted by Dr Emi Pena for admission                                             MDM    Disposition  Final diagnoses:   Generalized weakness   Acute renal failure superimposed on chronic kidney disease, unspecified CKD stage, unspecified acute renal failure type ([facility] [address] )   Malnutrition, unspecified type ([facility] [address] )     Time reflects when diagnosis was documented in both MDM as applicable and the Disposition within this note     Time User Action Codes Description Comment    9/21/2020  6:53 PM Golda Fothergill Add [R53 83] Fatigue     9/21/2020  6:53 PM Nappe, Samella Rosalee Remove [R53 83] Fatigue     9/21/2020  6:53 PM Nappe, Samella Rosalee Add [R53 1] Generalized weakness     9/21/2020  6:54 PM Nappe, Samella Rosalee Add [N17 9,  N18 9] Acute renal failure superimposed on chronic kidney disease, unspecified CKD stage, unspecified acute renal failure type ([facility] [address] )     9/21/2020  6:54 PM Nicole Archer Malnutrition, unspecified type ([facility] )     9/21/2020  8:04 PM Edweelva Gin Add [F03 90] Dementia ([place] [id number] )     9/21/2020  8:30 PM Edweelva Gin Add [N17 9,  N18 9] Acute kidney injury superimposed on chronic kidney disease [facility])       ED Disposition     ED Disposition Condition Date/Time Comment    Admit Stable Mon Sep 21, 2020  7:03 PM Case was discussed with general medicine and the patient's admission status was agreed to be Admission Status: inpatient status to the service of Dr Carl Quinones   Follow-up Information    None         Current Discharge Medication List      CONTINUE these medications which have NOT CHANGED    Details   apixaban (ELIQUIS) 2 5 mg Take 2 5 mg by mouth 2 (two) times a day      furosemide (LASIX) 40 mg tablet Take 40 mg by mouth 2 (two) times a day      metoprolol tartrate (LOPRESSOR) 50 mg tablet Take 50 mg by mouth 2 (two) times a day      bumetanide (BUMEX) 1 mg tablet Take 1 mg by mouth 2 (two) times a day Take 1 mg QD x 3 days while on increase dose of lasix THEN take 1 tab PO BID indefinitely      omeprazole (PriLOSEC) 20 mg delayed release capsule Take 20 mg by mouth daily           No discharge procedures on file      PDMP Review     None          ED Provider  Electronically Signed by           Yessi Veliz, DO  09/21/20 [address] [place] [address], DO  09/21/20 2000 Ernie Dsouza, DO  09/22/20 2000 Ernie Dsouza,   09/22/20 5012

## 2020-09-21 NOTE — ASSESSMENT & PLAN NOTE
On admission, patient is disoriented and per patient is tired and fatigued  Encephalopathy most likely due to patient's YAKOV      Plan:  Treat as per "YAKOV" problem

## 2020-09-21 NOTE — ASSESSMENT & PLAN NOTE
On admission, Patient's creatinine elevated to 2 38  Creatinine for the past three months ranged from 1 33-1  55  Patient most likely has a superimposed YAKOV on CKD  YAKOV are due to volume overload vs malnutrition/dehydration vs obstruction  Patient has a picture of volume overload with bilateral pitting edema on exam but no crackles , patient also has reported poor PO intake, along with recent urinary incontinence   Per family, no dialysis  Recent Labs     09/21/20  1718   CREATININE 2 38*         Plan:  Place in Resendiz for possible obstruction  Lasix 40mg IV to remove volume overload after resendiz  Recheck BMP tomorrow AM  Nephrology Consult as requested by family  Palliative Care/Hospice consult as requested by family  On renal restrictive diet

## 2020-09-22 PROBLEM — R19.7 DIARRHEA: Status: ACTIVE | Noted: 2020-09-22

## 2020-09-22 LAB
ANION GAP SERPL CALCULATED.3IONS-SCNC: 8 MMOL/L (ref 4–13)
BASOPHILS # BLD AUTO: 0.02 THOUSANDS/ΜL (ref 0–0.1)
BASOPHILS NFR BLD AUTO: 0 % (ref 0–1)
BUN SERPL-MCNC: 101 MG/DL (ref 5–25)
CALCIUM SERPL-MCNC: 9.8 MG/DL (ref 8.3–10.1)
CHLORIDE SERPL-SCNC: 94 MMOL/L (ref 100–108)
CO2 SERPL-SCNC: 32 MMOL/L (ref 21–32)
CREAT SERPL-MCNC: 2.38 MG/DL (ref 0.6–1.3)
EOSINOPHIL # BLD AUTO: 0.1 THOUSAND/ΜL (ref 0–0.61)
EOSINOPHIL NFR BLD AUTO: 2 % (ref 0–6)
ERYTHROCYTE [DISTWIDTH] IN BLOOD BY AUTOMATED COUNT: 19.8 % (ref 11.6–15.1)
GFR SERPL CREATININE-BSD FRML MDRD: 18 ML/MIN/1.73SQ M
GLUCOSE SERPL-MCNC: 72 MG/DL (ref 65–140)
HCT VFR BLD AUTO: 30.7 % (ref 34.8–46.1)
HGB BLD-MCNC: 9.3 G/DL (ref 11.5–15.4)
IMM GRANULOCYTES # BLD AUTO: 0.01 THOUSAND/UL (ref 0–0.2)
IMM GRANULOCYTES NFR BLD AUTO: 0 % (ref 0–2)
LYMPHOCYTES # BLD AUTO: 1.8 THOUSANDS/ΜL (ref 0.6–4.47)
LYMPHOCYTES NFR BLD AUTO: 27 % (ref 14–44)
MCH RBC QN AUTO: 22.2 PG (ref 26.8–34.3)
MCHC RBC AUTO-ENTMCNC: 30.3 G/DL (ref 31.4–37.4)
MCV RBC AUTO: 73 FL (ref 82–98)
MONOCYTES # BLD AUTO: 0.65 THOUSAND/ΜL (ref 0.17–1.22)
MONOCYTES NFR BLD AUTO: 10 % (ref 4–12)
NEUTROPHILS # BLD AUTO: 4.17 THOUSANDS/ΜL (ref 1.85–7.62)
NEUTS SEG NFR BLD AUTO: 61 % (ref 43–75)
NRBC BLD AUTO-RTO: 0 /100 WBCS
PLATELET # BLD AUTO: 350 THOUSANDS/UL (ref 149–390)
PMV BLD AUTO: 10.7 FL (ref 8.9–12.7)
POTASSIUM SERPL-SCNC: 3.6 MMOL/L (ref 3.5–5.3)
RBC # BLD AUTO: 4.19 MILLION/UL (ref 3.81–5.12)
SARS-COV-2 RNA RESP QL NAA+PROBE: NEGATIVE
SODIUM SERPL-SCNC: 134 MMOL/L (ref 136–145)
WBC # BLD AUTO: 6.75 THOUSAND/UL (ref 4.31–10.16)

## 2020-09-22 PROCEDURE — 85025 COMPLETE CBC W/AUTO DIFF WBC: CPT | Performed by: INTERNAL MEDICINE

## 2020-09-22 PROCEDURE — 99223 1ST HOSP IP/OBS HIGH 75: CPT | Performed by: INTERNAL MEDICINE

## 2020-09-22 PROCEDURE — 99232 SBSQ HOSP IP/OBS MODERATE 35: CPT | Performed by: INTERNAL MEDICINE

## 2020-09-22 PROCEDURE — 99223 1ST HOSP IP/OBS HIGH 75: CPT | Performed by: NURSE PRACTITIONER

## 2020-09-22 PROCEDURE — 87493 C DIFF AMPLIFIED PROBE: CPT | Performed by: PSYCHIATRY & NEUROLOGY

## 2020-09-22 PROCEDURE — 80048 BASIC METABOLIC PNL TOTAL CA: CPT | Performed by: INTERNAL MEDICINE

## 2020-09-22 RX ORDER — LOPERAMIDE HYDROCHLORIDE 2 MG/1
2 CAPSULE ORAL
Status: DISCONTINUED | OUTPATIENT
Start: 2020-09-22 | End: 2020-09-23 | Stop reason: HOSPADM

## 2020-09-22 RX ORDER — SODIUM CHLORIDE, SODIUM GLUCONATE, SODIUM ACETATE, POTASSIUM CHLORIDE, MAGNESIUM CHLORIDE, SODIUM PHOSPHATE, DIBASIC, AND POTASSIUM PHOSPHATE .53; .5; .37; .037; .03; .012; .00082 G/100ML; G/100ML; G/100ML; G/100ML; G/100ML; G/100ML; G/100ML
500 INJECTION, SOLUTION INTRAVENOUS ONCE
Status: COMPLETED | OUTPATIENT
Start: 2020-09-22 | End: 2020-09-23

## 2020-09-22 RX ADMIN — APIXABAN 2.5 MG: 2.5 TABLET, FILM COATED ORAL at 10:00

## 2020-09-22 RX ADMIN — PANTOPRAZOLE SODIUM 40 MG: 40 TABLET, DELAYED RELEASE ORAL at 06:37

## 2020-09-22 RX ADMIN — FUROSEMIDE 40 MG: 10 INJECTION, SOLUTION INTRAMUSCULAR; INTRAVENOUS at 09:00

## 2020-09-22 RX ADMIN — APIXABAN 2.5 MG: 2.5 TABLET, FILM COATED ORAL at 17:55

## 2020-09-22 RX ADMIN — METOPROLOL TARTRATE 50 MG: 50 TABLET, FILM COATED ORAL at 10:00

## 2020-09-22 RX ADMIN — SODIUM CHLORIDE, SODIUM GLUCONATE, SODIUM ACETATE, POTASSIUM CHLORIDE, MAGNESIUM CHLORIDE, SODIUM PHOSPHATE, DIBASIC, AND POTASSIUM PHOSPHATE 500 ML: .53; .5; .37; .037; .03; .012; .00082 INJECTION, SOLUTION INTRAVENOUS at 17:55

## 2020-09-22 NOTE — ASSESSMENT & PLAN NOTE
Patient's corrected calcium is elevated at 10 6 most likely due to malnutrition/dehydration      Check with BMP AM draw while patient is on diet

## 2020-09-22 NOTE — CONSULTS
Consultation - Nephrology   Shanelle Fink 80 y o  female MRN: [id number]  Unit/Bed#: S -01 Encounter: [id number]    ASSESSMENT:  1  Acute Kidney Injury, POA- likely secondary to overdiuresis  - hold diuretics  - consider 500ml IVF  - resendiz catheter in place, making urine  - no imaging performed, consider if creatinine does not improve  - avoid hypotension, avoid nephrotoxins  - no acute indication for dialysis  - patient and family do not want dialysis going forward  2  Chronic Kidney Disease stage III- Baseline creatinine is 1 7 from September 2020 and prior  Patient has not seen a nephrologist in the past and would like our office to set up office follow up upon discharge  3  Diarrhea- rule out C diff  - would recommend holding further diuretics today and reevaluate tomorrow  4  Mild Hypercalcemia- likely secondary to diarrhea & volume depletion  5  Hyponatremia- likely secondary to volume depletion  - agree with fluid restriction  6  CHF- currently appears compensated but does still have bilateral LE edema improved from outpatient per report  - lasix and bumex on home medication list but last month cardiology discontinued lasix and increased bumex to 2mg BID however it is unclear if the patient was receiving this at the nursing home  - lost 20 lbs in 1 month per granddaughter  - CXR 9/21 without volume overload  7  Azotemia- consider secondary to overdiuresis  8  Anemia- hgb slightly below baseline  - check iron studies and FOBT  9  Goals of Care- ongoing  - family not ready for hospice    PLAN:  Hold diuretics  Consider gentle timed IVF  Iron studies & FOBT    Discussed with [facility] resident  Discussed with daughter Ortiz Caal) & [facility] via phone    HISTORY OF PRESENT ILLNESS:  Requesting Physician: Salazar Carpio MD  Reason for Consult: YAKOV Fink is a 80y o  year old female who was admitted to [address] [place] [address] after presenting with an elevated creatinine    A renal consultation is requested today for assistance in the management of acute kidney injury  The patient states she is feeling well overall except for diarrhea which the hospital aide states has been ongoing every 15 minutes  The patient states her rectum is burning  She denies shortness of breath  She denies N/V  She has lower extremity edema, but on exam, it appears they have been worse in the past     According to the granddaughter's report, the patient was was not urinating well for the past 2 days and with an elevated creatinine, the nursing facility wanted her to be evaluated in the ER  About a month ago, the patient went to her cardiologist and he noted she was on two diuretics  He stopped her lasix and increased her bumex at that time  Unfortunately, it is unclear if she was getting this changed dose at [place] as her medication list still has both bumex and lasix listed  Per the granddaughter, the patient lost 20 lbs in the past month and her LE edema was much improved        PAST MEDICAL HISTORY:  Past Medical History:   Diagnosis Date    Chronic kidney disease     GERD (gastroesophageal reflux disease)     Hyperlipidemia     Hypertension     S/P coronary artery bypass graft x 5     Stroke ([facility] [address] [facility]        PAST SURGICAL HISTORY:  Past Surgical History:   Procedure Laterality Date    FRACTURE SURGERY         ALLERGIES:  No Known Allergies    SOCIAL HISTORY:  Social History     Substance and Sexual Activity   Alcohol Use No     Social History     Substance and Sexual Activity   Drug Use No     Social History     Tobacco Use   Smoking Status Never Smoker   Smokeless Tobacco Never Used       FAMILY HISTORY:  Family History   Family history unknown: Yes       MEDICATIONS:    Current Facility-Administered Medications:     apixaban (ELIQUIS) tablet 2 5 mg, 2 5 mg, Oral, BID, Luis Moreau DO, 2 5 mg at 09/22/20 1000    loperamide (IMODIUM) capsule 2 mg, 2 mg, Oral, HS, KADEN Castellanos   metoprolol tartrate (LOPRESSOR) tablet 50 mg, 50 mg, Oral, BID, Luis Moreau DO, 50 mg at 09/22/20 1000    pantoprazole (PROTONIX) EC tablet 40 mg, 40 mg, Oral, Early Morning, Luis Moreau, DO, 40 mg at 09/22/20 5818    REVIEW OF SYSTEMS:  A complete 10 point review of systems was performed and found to be negative unless otherwise noted in the history of present illness  General: No fevers, chills  Cardiovascular:  No chest pain, + leg edema  Respiratory: No cough, sputum production,  No shortness of breath  Gastrointestinal:  No nausea/vomiting,  + diarrhea,  No abdominal pain  Genitourinary: No hematuria  No foamy urine  No dysuria    PHYSICAL EXAM:  Current Weight: Weight - Scale: 76 3 kg (168 lb 3 4 oz)  First Weight: Weight - Scale: 76 3 kg (168 lb 3 4 oz)  Vitals:    09/21/20 2130 09/21/20 2200 09/22/20 0648 09/22/20 1514   BP: 130/75 107/58 130/80 110/62   BP Location: Right arm Right arm Right arm Right arm   Pulse: (!) 110 96 80 85   Resp: 16 18 19 16   Temp:  97 6 °F (36 4 °C) 98 2 °F (36 8 °C) 98 5 °F (36 9 °C)   TempSrc:  Oral Oral Oral   SpO2: 100% 100% 95% 99%   Weight:  76 3 kg (168 lb 3 4 oz)     Height:  5' 2" (1 575 m)         Intake/Output Summary (Last 24 hours) at 9/22/2020 1524  Last data filed at 9/22/2020 1514  Gross per 24 hour   Intake 840 ml   Output 1100 ml   Net -260 ml     General: NAD  Skin: no rash  Eyes: anicteric  ENMT: mm moist  Neck: no masses  Respiratory: CTAB  CVS: RRR  Extremities: + bilateral edema   GI: soft nt nd  Neuro: alert awake  Psych: mood and affect appropriate    Invasive Devices:   Urethral Catheter Latex 16 Fr  (Active)   Site Assessment Skin intact; Clean;Patent 09/21/20 2200   Collection Container Standard drainage bag 09/21/20 2200   Securement Method Securing device (Describe) 09/21/20 2200   Output (mL) 450 mL 09/22/20 1229     Lab Results:   Results from last 7 days   Lab Units 09/22/20  0435 09/21/20  1718   WBC Thousand/uL 6 75 7 63 HEMOGLOBIN g/dL 9 3* 9 7*   HEMATOCRIT % 30 7* 31 1*   PLATELETS Thousands/uL 350 403*   POTASSIUM mmol/L 3 6 3 6   CHLORIDE mmol/L 94* 92*   CO2 mmol/L 32 31   BUN mg/dL 101* 96*   CREATININE mg/dL 2 38* 2 38*   CALCIUM mg/dL 9 8 9 4   ALK PHOS U/L  --  157*   ALT U/L  --  16   AST U/L  --  55*

## 2020-09-22 NOTE — ASSESSMENT & PLAN NOTE
Patient's corrected calcium is elevated at 10 6 most likely due to malnutrition/dehydration  Results for Alicia Palomo (MRN [id number]) as of 9/22/2020 14:26   Ref   Range 10/20/2016 00:08 9/21/2020 17:18 9/22/2020 04:35   Calcium Latest Ref Range: 8 3 - 10 1 mg/dL 9 5 9 4 9 8

## 2020-09-22 NOTE — ASSESSMENT & PLAN NOTE
Recent Labs     09/21/20  1718   SODIUM 133*     No results found for: OSMOUA, NAUR, OSMOLALITSER    Workup:  · Sodium 133 on admission  · Likely Hypoosmolar hypervolemic hyponatremia secondary to dehydration, poor PO intake    Plan:  · Encourage adequate oral intake  · Monitor BMP qd  Fluid restriction

## 2020-09-22 NOTE — CASE MANAGEMENT
Family has decided they prefer not to pursue hospice at this time after meeting with Palliative Care and Hospice  Pt will be returning to [facility] where she is a Medicaid bed hold when stable for DC  Cm will continue to follow to assist with needs and planning

## 2020-09-22 NOTE — H&P
H&P- Ti Helton 6/18/1932, 80 y o  female MRN: [id number]    Unit/Bed#: ED 29 Encounter: [id number]    Primary Care Provider: Daniel Merino MD   Date and time admitted to hospital: 9/21/2020  4:51 PM        * Acute kidney injury superimposed on chronic kidney disease ([facility] [address] )  Assessment & Plan  On admission, Patient's creatinine elevated to 2 38  Creatinine for the past three months ranged from 1 33-1  55  Patient most likely has a superimposed YAKOV on CKD  YAKOV are due to volume overload vs malnutrition/dehydration vs obstruction  Patient has a picture of volume overload with bilateral pitting edema on exam but no crackles , patient also has reported poor PO intake, along with recent urinary incontinence  Per family, no dialysis  Recent Labs     09/21/20  1718   CREATININE 2 38*         Plan:  Place in Resendiz for possible obstruction  Lasix 40mg IV to remove volume overload after resendiz  Recheck BMP tomorrow AM  Nephrology Consult as requested by family  Palliative Care/Hospice consult as requested by family  On renal restrictive diet    Hypercalcemia  Assessment & Plan  Patient's corrected calcium is elevated at 10 6 most likely due to malnutrition/dehydration  Check with BMP AM draw while patient is on diet    Hyponatremia  Assessment & Plan  Recent Labs     09/21/20  1718   SODIUM 133*     No results found for: OSMOUA, NAUR, OSMOLALITSER    Workup:  · Sodium 133 on admission  · Likely Hypoosmolar hypervolemic hyponatremia secondary to dehydration, poor PO intake    Plan:  · Encourage adequate oral intake  · Monitor BMP qd  Fluid restriction      Congestive heart failure (CHF) ([facility])  Assessment & Plan  Wt Readings from Last 3 Encounters:   07/31/20 76 3 kg (168 lb 3 2 oz)   07/13/20 76 2 kg (168 lb)   10/19/16 99 8 kg (220 lb)     Patient has PMHx of CHF  Patient has chronic volume overload with bilateral pitting edema  Patient is on lasix and bumex as home meds       Plan:   Presents with increased shortness of breath and lower extremity edema  Past cardiac history: CHF and Afib on Eliquis, CAD   Most recent echocardiogram on 9/21 showing Afib   Primary cardiologist: Dr Nani Mcdaniel    Workup:  No results found for: NTBNP   Home Cardiac Medications:  o Lasix, Bumex, Eliquis   Physical exam: Patient is currently volume overloaded  Bilateral pitting edema   EKG: Irregularly irregular   CXR: No acute cardiopulmonary disease    Plan:   Diuresis: Lasix IV 40mg after resendiz is placed  · Daily weights  · Measure I/Os  · Monitor on Telemetry  · HF Diet:  · Sodium restricted diet 2g  · Fluid restriction 1500 mL  · Elevate head of bed to at least 30°  · Labs: BMP, magnesium tomorrow a m  Anemia due to chronic kidney disease  Assessment & Plan  Patient has baseline anemia from 8 1-10 0 over the past few months  Currently anemia is baseline at 9 7  Trend through BMP AM draw  Recent Labs     09/21/20  1718   HGB 9 7*         Atrial fibrillation (HCC)  Assessment & Plan  Irregularly irregularrhythm on PE and found on EKG  Currently asymptomatic    On home Eliquis      VTE Prophylaxis: Apixaban (Eliquis)  / sequential compression device and foot pump applied   Code Status: DNAR and DNI  POLST: POLST is not applicable to this patient    Anticipated Length of Stay:  Patient will be admitted on an Inpatient basis with an anticipated length of stay of  > 2 midnights  Justification for Hospital Stay: YAKOV    Chief Complaint:   Fatigue    History of Present Illness:    Denise Glover is a 80 y o  female w/ PMHx of CHF previously on bumex/lasix, Afib on Eliquis, CKD stage 3, CAD s/p Coronary Artery Bypass Graft who presents for further care concerning progressive renal failure  Patient comes to the ED from [facility]  History was made with patient's granddaughter's input  Patient for the last 2 days have been having urinary incontinence  Also patient had poor PO intake recently with malnutrition and dehydration  Per family, there was trouble in managing patient's bumex and lasix for volume overload and there was constant change in dosing of these medications  For the past few days patient did not take the medications  Family sent patient to the ED in order to start up care with nephrology concerning patient's kidneys and consider options of palliative/hospice care for her  Family does not want dialysis for the patient  Family denies patient having fevers, chills, dysuria,      Review of Systems:    Review of Systems   Constitutional: Positive for appetite change and unexpected weight change  Negative for chills and fever  Respiratory: Negative for chest tightness and shortness of breath  Cardiovascular: Positive for leg swelling (Chronic and Tender)  Negative for chest pain  Gastrointestinal: Negative for abdominal distention and abdominal pain  Genitourinary: Positive for decreased urine volume  Negative for dysuria and flank pain  Allergic/Immunologic: Negative for environmental allergies and food allergies  Neurological: Negative for tremors and numbness  Psychiatric/Behavioral: Negative for agitation and behavioral problems  Past Medical and Surgical History:     Past Medical History:   Diagnosis Date    Chronic kidney disease     GERD (gastroesophageal reflux disease)     Hyperlipidemia     Hypertension     S/P coronary artery bypass graft x 5     Stroke [facility])        Past Surgical History:   Procedure Laterality Date    FRACTURE SURGERY         Meds/Allergies:    Prior to Admission medications    Medication Sig Start Date End Date Taking?  Authorizing Provider   apixaban (ELIQUIS) 2 5 mg Take 2 5 mg by mouth 2 (two) times a day    Historical Provider, MD   bumetanide (BUMEX) 1 mg tablet Take 1 mg by mouth 2 (two) times a day Take 1 mg QD x 3 days while on increase dose of lasix THEN take 1 tab PO BID indefinitely    Historical Provider, MD   furosemide (LASIX) 40 mg tablet Take 40 mg by mouth 2 (two) times a day    Historical Provider, MD   metoprolol tartrate (LOPRESSOR) 50 mg tablet Take 50 mg by mouth 2 (two) times a day    Historical Provider, MD   omeprazole (PriLOSEC) 20 mg delayed release capsule Take 20 mg by mouth daily    Historical Provider, MD     I have reviewed home medications with patient family member  Allergies: No Known Allergies    Social History:     Marital Status: /Civil Union   Occupation: N/A  Patient Pre-hospital Living Situation: [place]  Patient Pre-hospital Level of Mobility: N/A  Patient Pre-hospital Diet Restrictions: Poor Oral Intake  Substance Use History:   Social History     Substance and Sexual Activity   Alcohol Use No     Social History     Tobacco Use   Smoking Status Never Smoker   Smokeless Tobacco Never Used     Social History     Substance and Sexual Activity   Drug Use No       Family History:    Family History   Family history unknown: Yes       Physical Exam:     Vitals:   Blood Pressure: 131/59 (20)  Pulse: (!) 106 (20)  Temperature: 97 5 °F (36 4 °C) (20)  Temp Source: Oral (20)  Respirations: 16 (20)  SpO2: 100 % (20)    Physical Exam  Constitutional:       Appearance: She is normal weight  HENT:      Head: Normocephalic and atraumatic  Nose: Nose normal       Mouth/Throat:      Mouth: Mucous membranes are moist    Eyes:      Extraocular Movements: Extraocular movements intact  Pupils: Pupils are equal, round, and reactive to light  Cardiovascular:      Rate and Rhythm: Rhythm irregularly irregular  Pulses: Normal pulses  Pulmonary:      Effort: Pulmonary effort is normal       Breath sounds: Normal breath sounds  Abdominal:      General: Bowel sounds are normal       Palpations: Abdomen is soft  Tenderness: There is no abdominal tenderness  Musculoskeletal: Normal range of motion  Right lower le+ Pitting Edema present        Left lower le+ Pitting Edema (Tender) present  Skin:     General: Skin is warm and dry  Capillary Refill: Capillary refill takes less than 2 seconds  Neurological:      General: No focal deficit present  Psychiatric:         Mood and Affect: Mood normal          Behavior: Behavior normal          Additional Data:     Lab Results: I have personally reviewed pertinent reports  Results from last 7 days   Lab Units 20  1718   WBC Thousand/uL 7 63   HEMOGLOBIN g/dL 9 7*   HEMATOCRIT % 31 1*   PLATELETS Thousands/uL 403*   NEUTROS PCT % 58   LYMPHS PCT % 29   MONOS PCT % 11   EOS PCT % 2     Results from last 7 days   Lab Units 20  1718   POTASSIUM mmol/L 3 6   CHLORIDE mmol/L 92*   CO2 mmol/L 31   BUN mg/dL 96*   CREATININE mg/dL 2 38*   CALCIUM mg/dL 9 4   ALK PHOS U/L 157*   ALT U/L 16   AST U/L 55*     Results from last 7 days   Lab Units 20  1718   INR  1 97*       Imaging: I have personally reviewed pertinent reports  Xr Chest 1 View Portable    Result Date: 2020  Narrative: CHEST INDICATION:   fatigue  Patient has confirmed COVID-19  Tested positive on 2020  COMPARISON:  None EXAM PERFORMED/VIEWS:  XR CHEST PORTABLE FINDINGS: Cardiomediastinal silhouette appears unremarkable  Median sternotomy  The lungs are clear  No pneumothorax or pleural effusion  Osseous structures appear within normal limits for patient age  Impression: No acute cardiopulmonary disease  Workstation performed: [id number]       EKG, Pathology, and Other Studies Reviewed on Admission:   · EKG: Irregularly irregula    Epic / Care Everywhere Records Reviewed: Yes     ** Please Note: This note has been constructed using a voice recognition system   **

## 2020-09-22 NOTE — UTILIZATION REVIEW
Notification of Inpatient Admission/Inpatient Authorization Request   This is a Notification of Inpatient Admission for [address]  Be advised that this patient was admitted to our facility under Inpatient Status  Contact Andrez Argueta at [phone] for additional admission information  [facility]  DEDICATED -[phone]  Patient Name:   Herb Gregory   YOB: 1932       [address]   [address]:   [place] Decker  Tax ID: [id number]  NPI: [id number] Attending Provider/NPI:  Address:  Phone: Brenda Madera, [place] [[id number]]  Same as the facility  [phone]   Place of Service Code: 24 Place of Service Name:  [address] [place] [address]   Start Date: 9/21/20 1903     Discharge Date & Time: No discharge date for patient encounter  Type of Admission: Inpatient Status Discharge Disposition   (if discharged): Home/Self Care   Patient Diagnoses: Fatigue [R53 83]  Dementia ([address] ) [F03 90]  Generalized weakness [R53 1]  Acute kidney injury superimposed on chronic kidney disease ([address] ) [N17 9, N18 9]  Acute renal failure superimposed on chronic kidney disease, unspecified CKD stage, unspecified acute renal failure type ([address] ) [N17 9, N18 9]  Malnutrition, unspecified type ([address] ) [E46]     Orders: Admission Orders (From admission, onward)     Ordered        09/21/20 1903  Inpatient Admission  Once                    Assigned Utilization Review Contact: Andrez Argueta  Utilization   Network Utilization Review Department  Phone: [phone]; Fax [phone]  Email: [email or web address]   ATTENTION PAYERS: Please call the assigned Utilization  directly with any questions or concerns ALL voicemails in the department are confidential  Send all requests for admission clinical reviews, approved or denied determinations and any other requests to dedicated fax number belonging to the campus where the patient is receiving treatment

## 2020-09-22 NOTE — ASSESSMENT & PLAN NOTE
Wt Readings from Last 3 Encounters:   09/21/20 76 3 kg (168 lb 3 4 oz)   07/31/20 76 3 kg (168 lb 3 2 oz)   07/13/20 76 2 kg (168 lb)     Patient has PMHx of CHF  Patient has chronic volume overload with bilateral pitting edema  Patient is on lasix and bumex as home meds  Chronic combined systolic and diastolic CHF in the setting of CHF documented in the H&P with unspecified acuity and type based on provider notes from [address] [facility] (no past ECHO on chart)  Presents with increased shortness of breath and lower extremity edema  On PE pt had picture of volume overload with B/L pitting edema, but no crackles  No ECHO in chart, but last 3 nursing home Provider visits (7/13/20, 7/31/20, 9/3/20) document chronic combined systolic systolic and diastolic CHF  Primary cardiologist: Dr Hanny Jung    Workup:  Lab Results   Component Value Date    NTBNP 14,621 (H) 09/21/2020      Home Cardiac Medications:  o Lasix, Bumex, Eliquis   Physical exam: Patient is currently volume overloaded    Bilateral pitting edema, per family this is chronic and pt LL edema is at baseline   EKG: Irregularly irregular   CXR: No acute cardiopulmonary disease    Plan:   Diuresis: Lasix 40mg bid will be held tonight in the presence of new onset diarrhea   Mejias in place  · Daily weights  · Continue to measure I/Os  Intake/Output Summary (Last 24 hours) at 9/22/2020 1430  Last data filed at 9/22/2020 1229  Gross per 24 hour   Intake 720 ml   Output 1100 ml   Net -380 ml     · Monitor on Telemetry  · HF Diet:  · Sodium restricted diet 2g  · Fluid restriction 1500 mL  · Elevate head of bed to at least 30°

## 2020-09-22 NOTE — ASSESSMENT & PLAN NOTE
Recent Labs     09/21/20  1718 09/22/20  0435   SODIUM 133* 134*     Lab Results   Component Value Date    NAUR 40 09/21/2020     Workup:  · Sodium 133 on admission  · Likely Hypoosmolar hypervolemic hyponatremia secondary to dehydration, poor PO intake    Plan:  · Encourage adequate oral intake  · Monitor BMP qd  Fluid restriction

## 2020-09-22 NOTE — PROGRESS NOTES
Patient asleep in bed  No visible signs of distress noted at this time  Lighting adjusted in room  Bed low and locked; call bell within reach  Will continue to monitor   Isabelle Arriaza RN

## 2020-09-22 NOTE — ASSESSMENT & PLAN NOTE
Wt Readings from Last 3 Encounters:   07/31/20 76 3 kg (168 lb 3 2 oz)   07/13/20 76 2 kg (168 lb)   10/19/16 99 8 kg (220 lb)     Patient has PMHx of CHF  Patient has chronic volume overload with bilateral pitting edema  Patient is on lasix and bumex as home meds  Plan:   Presents with increased shortness of breath and lower extremity edema  Past cardiac history: CHF and Afib on Eliquis, CAD   Most recent echocardiogram on 9/21 showing Afib   Primary cardiologist: Dr Earnest Chaudhry    Workup:  No results found for: NTBNP   Home Cardiac Medications:  o Lasix, Bumex, Eliquis   Physical exam: Patient is currently volume overloaded  Bilateral pitting edema   EKG: Irregularly irregular   CXR: No acute cardiopulmonary disease    Plan:   Diuresis: Lasix IV 40mg after resendiz is placed  · Daily weights  · Measure I/Os  · Monitor on Telemetry  · HF Diet:  · Sodium restricted diet 2g  · Fluid restriction 1500 mL  · Elevate head of bed to at least 30°  · Labs: BMP, magnesium tomorrow a m

## 2020-09-22 NOTE — PROGRESS NOTES
Progress Note - Selma Arteaga 6/18/1932, 80 y o  female MRN: [id number]    Unit/Bed#: S -01 Encounter: [id number]    Primary Care Provider: Geovani Giraldo MD   Date and time admitted to hospital: 9/21/2020  4:51 PM        * Acute kidney injury superimposed on chronic kidney disease ([facility] [address] )  Assessment & Plan  On admission, Patient's creatinine elevated to 2 38  Creatinine for the past three months ranged from 1 33-1  55  Patient most likely has a superimposed YAKOV on CKD  YAKOV are due to volume overload vs malnutrition/dehydration vs obstruction  Patient has a picture of volume overload with bilateral pitting edema on exam but no crackles , patient also has reported poor PO intake, along with recent urinary incontinence  Per family, no dialysis  Recent Labs     09/21/20  1718 09/22/20  0435   CREATININE 2 38* 2 38*       Intake/Output Summary (Last 24 hours) at 9/22/2020 1423  Last data filed at 9/22/2020 1229  Gross per 24 hour   Intake 720 ml   Output 1100 ml   Net -380 ml     Plan:  - Mejias is still in place  - Per nephrology IV Lasix will be switched to PO  In the presence of new onset diarrhea we will hold Lasix evening dose today  Nephology is not recommending dialysis at this time  - Palliative Care: Spoke with patients family and at this time family would not like to pursue Hospice care  Pt has only had 1 admission to the hospital in 6 months  - On renal restrictive diet    Chronic combined systolic and diastolic congestive heart failure [facility])  Assessment & Plan  Wt Readings from Last 3 Encounters:   09/21/20 76 3 kg (168 lb 3 4 oz)   07/31/20 76 3 kg (168 lb 3 2 oz)   07/13/20 76 2 kg (168 lb)     Patient has PMHx of CHF  Patient has chronic volume overload with bilateral pitting edema  Patient is on lasix and bumex as home meds   Chronic combined systolic and diastolic CHF in the setting of CHF documented in the H&P with unspecified acuity and type based on provider notes from [address] [place] [address] nursing home (no past ECHO on chart)  Presents with increased shortness of breath and lower extremity edema  On PE pt had picture of volume overload with B/L pitting edema, but no crackles  No ECHO in chart, but last 3 nursing home Provider visits (7/13/20, 7/31/20, 9/3/20) document chronic combined systolic systolic and diastolic CHF  Primary cardiologist: Dr Earnest Chaudhry    Workup:  Lab Results   Component Value Date    NTBNP 14,621 (H) 09/21/2020      Home Cardiac Medications:  o Lasix, Bumex, Eliquis   Physical exam: Patient is currently volume overloaded  Bilateral pitting edema, per family this is chronic and pt LL edema is at baseline   EKG: Irregularly irregular   CXR: No acute cardiopulmonary disease    Plan:   Diuresis: Lasix 40mg bid will be held tonight in the presence of new onset diarrhea   Mejias in place  · Daily weights  · Continue to measure I/Os  Intake/Output Summary (Last 24 hours) at 9/22/2020 1430  Last data filed at 9/22/2020 1229  Gross per 24 hour   Intake 720 ml   Output 1100 ml   Net -380 ml     · Monitor on Telemetry  · HF Diet:  · Sodium restricted diet 2g  · Fluid restriction 1500 mL  · Elevate head of bed to at least 30°          Diarrhea  Assessment & Plan  Since 12pm today pt has had 4 large episodes of diarrhea, pt is also complaining of burning around her anus  - Per nephrology, will hold evening dose of lasix today  - C  Diff toxin PCR was ordered, will follow up on results tomorrow    Hypercalcemia  Assessment & Plan  Patient's corrected calcium is elevated at 10 6 most likely due to malnutrition/dehydration  Results for Vy Joya (MRN [id number]) as of 9/22/2020 14:26   Ref   Range 10/20/2016 00:08 9/21/2020 17:18 9/22/2020 04:35   Calcium Latest Ref Range: 8 3 - 10 1 mg/dL 9 5 9 4 9 8       Hyponatremia  Assessment & Plan  Recent Labs     09/21/20  1718 09/22/20  0435   SODIUM 133* 134*     Lab Results   Component Value Date    NAUR 40 09/21/2020 Workup:  · Sodium 133 on admission  · Likely Hypoosmolar hypervolemic hyponatremia secondary to dehydration, poor PO intake    Plan:  · Encourage adequate oral intake  · Monitor BMP qd  Fluid restriction      Anemia due to chronic kidney disease  Assessment & Plan  Patient has baseline anemia from 8 1-10 0 over the past few months  Currently anemia is baseline at 9 7  Trend through BMP AM draw  Recent Labs     20  1718 20  0435   HGB 9 7* 9 3*         Atrial fibrillation (HCC)  Assessment & Plan  Irregularly irregularrhythm on PE and found on EKG  Currently asymptomatic    On home Eliquis      VTE Pharmacologic Prophylaxis:   Pharmacologic: Apixaban (Eliquis)  Mechanical VTE Prophylaxis in Place: Yes    Discussions with Specialists or Other Care Team Provider:  Nephrology, palliative    Education and Discussions with Family / Patient: Spoke with pt and family at bedside; they are aware of the plan    Current Length of Stay: 1 day(s)    Current Patient Status: Inpatient     Discharge Plan / Estimated Discharge Date:  48 hours    Code Status: Level 3 - DNAR and DNI      Subjective: In the morning pt did not have any acute complaints, she denied difficulty breathing, and was able to answer questions appropriately  Around 2:00 p m  this pt was now complaining of severe and painful diarrhea  According to nurses pt had 4 large episodes diarrhea starting at noon  Pt is having incontinence of stool and this is not her baseline  Pt was in denies difficulty breathing, nausea or vomiting and states pain is only around anus    Family was in the room during evaluation and they state that her lower leg edema is stable and looks better than usual     Objective:     Vitals:   Temp (24hrs), Av 8 °F (36 6 °C), Min:97 5 °F (36 4 °C), Max:98 2 °F (36 8 °C)    Temp:  [97 5 °F (36 4 °C)-98 2 °F (36 8 °C)] 98 2 °F (36 8 °C)  HR:  [] 80  Resp:  [16-20] 19  BP: (107-131)/(58-80) 130/80  SpO2:  [95 %-100 %] 95 %  Body mass index is 30 77 kg/m²  Input and Output Summary (last 24 hours): Intake/Output Summary (Last 24 hours) at 9/22/2020 1453  Last data filed at 9/22/2020 1229  Gross per 24 hour   Intake 720 ml   Output 1100 ml   Net -380 ml       Physical Exam:     Physical Exam  Vitals signs and nursing note reviewed  Constitutional:       General: She is in acute distress (Pt was uncomfortable due to new onset diarrhea)  Appearance: Normal appearance  She is obese  She is not ill-appearing, toxic-appearing or diaphoretic  HENT:      Head: Normocephalic and atraumatic  Right Ear: External ear normal       Left Ear: External ear normal       Mouth/Throat:      Mouth: Mucous membranes are moist    Eyes:      General: No scleral icterus  Right eye: No discharge  Left eye: No discharge  Conjunctiva/sclera: Conjunctivae normal    Neck:      Musculoskeletal: Normal range of motion and neck supple  Cardiovascular:      Rate and Rhythm: Normal rate and regular rhythm  Heart sounds: No friction rub  Pulmonary:      Effort: Pulmonary effort is normal  No respiratory distress  Breath sounds: Normal breath sounds  No stridor  No wheezing, rhonchi or rales  Chest:      Chest wall: No tenderness  Abdominal:      General: Abdomen is flat  Bowel sounds are normal  There is no distension  Palpations: Abdomen is soft  There is no mass  Tenderness: There is no abdominal tenderness  There is no right CVA tenderness, left CVA tenderness, guarding or rebound  Musculoskeletal: Normal range of motion  General: No swelling, tenderness, deformity or signs of injury  Right lower leg: 3+ Edema present  Left lower leg: 3+ Edema present  Comments: Lower leg edema bilaterally +3   Skin:     General: Skin is warm  Coloration: Skin is not jaundiced or pale  Findings: No erythema  Neurological:      General: No focal deficit present        Mental Status: She is alert and oriented to person, place, and time  Sensory: No sensory deficit  Motor: No weakness  Psychiatric:         Mood and Affect: Mood normal          Behavior: Behavior normal          Judgment: Judgment normal        Additional Data:     Labs:    Results from last 7 days   Lab Units 09/22/20  0435   WBC Thousand/uL 6 75   HEMOGLOBIN g/dL 9 3*   HEMATOCRIT % 30 7*   PLATELETS Thousands/uL 350   NEUTROS PCT % 61   LYMPHS PCT % 27   MONOS PCT % 10   EOS PCT % 2     Results from last 7 days   Lab Units 09/22/20  0435 09/21/20  1718   POTASSIUM mmol/L 3 6 3 6   CHLORIDE mmol/L 94* 92*   CO2 mmol/L 32 31   BUN mg/dL 101* 96*   CREATININE mg/dL 2 38* 2 38*   CALCIUM mg/dL 9 8 9 4   ALK PHOS U/L  --  157*   ALT U/L  --  16   AST U/L  --  55*     Results from last 7 days   Lab Units 09/21/20  1718   INR  1 97*       * I Have Reviewed All Lab Data Listed Above  * Additional Pertinent Lab Tests Reviewed: [address] Admission Reviewed    Imaging:    Imaging Reports Reviewed Today Include:  None  Imaging Personally Reviewed by Myself Includes:  None    Recent Cultures (last 7 days):           Last 24 Hours Medication List:   Current Facility-Administered Medications   Medication Dose Route Frequency Provider Last Rate    apixaban  2 5 mg Oral BID Luis Moreau DO      loperamide  2 mg Oral HS KADEN Dubois      metoprolol tartrate  50 mg Oral BID Luis Moreau DO      pantoprazole  40 mg Oral Early Morning Luis Moreau DO          Today, Patient Was Seen By: Aria Ruano DO    ** Please Note: This note has been constructed using a voice recognition system   **

## 2020-09-22 NOTE — ASSESSMENT & PLAN NOTE
Patient has baseline anemia from 8 1-10 0 over the past few months  Currently anemia is baseline at 9 7      Trend through BMP AM draw  Recent Labs     09/21/20  1718 09/22/20  0435   HGB 9 7* 9 3*

## 2020-09-22 NOTE — PLAN OF CARE
Problem: PAIN - ADULT  Goal: Verbalizes/displays adequate comfort level or baseline comfort level  Description: Interventions:  - Encourage patient to monitor pain and request assistance  - Assess pain using appropriate pain scale  - Administer analgesics based on type and severity of pain and evaluate response  - Implement non-pharmacological measures as appropriate and evaluate response  - Consider cultural and social influences on pain and pain management  - Notify physician/advanced practitioner if interventions unsuccessful or patient reports new pain  Outcome: Progressing     Problem: INFECTION - ADULT  Goal: Absence or prevention of progression during hospitalization  Description: INTERVENTIONS:  - Assess and monitor for signs and symptoms of infection  - Monitor lab/diagnostic results  - Monitor all insertion sites, i e  indwelling lines, tubes, and drains  - Monitor endotracheal if appropriate and nasal secretions for changes in amount and color  - [place] appropriate cooling/warming therapies per order  - Administer medications as ordered  - Instruct and encourage patient and family to use good hand hygiene technique  - Identify and instruct in appropriate isolation precautions for identified infection/condition  Outcome: Progressing  Goal: Absence of fever/infection during neutropenic period  Description: INTERVENTIONS:  - Monitor WBC    Outcome: Progressing     Problem: SAFETY ADULT  Goal: Patient will remain free of falls  Description: INTERVENTIONS:  - Assess patient frequently for physical needs  -  Identify cognitive and physical deficits and behaviors that affect risk of falls    -  [place] fall precautions as indicated by assessment   - Educate patient/family on patient safety including physical limitations  - Instruct patient to call for assistance with activity based on assessment  - Modify environment to reduce risk of injury  - Consider OT/PT consult to assist with strengthening/mobility  Outcome: Progressing  Goal: Maintain or return to baseline ADL function  Description: INTERVENTIONS:  -  Assess patient's ability to carry out ADLs; assess patient's baseline for ADL function and identify physical deficits which impact ability to perform ADLs (bathing, care of mouth/teeth, toileting, grooming, dressing, etc )  - Assess/evaluate cause of self-care deficits   - Assess range of motion  - Assess patient's mobility; develop plan if impaired  - Assess patient's need for assistive devices and provide as appropriate  - Encourage maximum independence but intervene and supervise when necessary  - Involve family in performance of ADLs  - Assess for home care needs following discharge   - Consider OT consult to assist with ADL evaluation and planning for discharge  - Provide patient education as appropriate  Outcome: Progressing  Goal: Maintain or return mobility status to optimal level  Description: INTERVENTIONS:  - Assess patient's baseline mobility status (ambulation, transfers, stairs, etc )    - Identify cognitive and physical deficits and behaviors that affect mobility  - Identify mobility aids required to assist with transfers and/or ambulation (gait belt, sit-to-stand, lift, walker, cane, etc )  - [place] fall precautions as indicated by assessment  - Record patient progress and toleration of activity level on Mobility SBAR; progress patient to next Phase/Stage  - Instruct patient to call for assistance with activity based on assessment  - Consider rehabilitation consult to assist with strengthening/weightbearing, etc   Outcome: Progressing     Problem: DISCHARGE PLANNING  Goal: Discharge to home or other facility with appropriate resources  Description: INTERVENTIONS:  - Identify barriers to discharge w/patient and caregiver  - Arrange for needed discharge resources and transportation as appropriate  - Identify discharge learning needs (meds, wound care, etc )  - Arrange for interpretive services to assist at discharge as needed  - Refer to Case Management Department for coordinating discharge planning if the patient needs post-hospital services based on physician/advanced practitioner order or complex needs related to functional status, cognitive ability, or social support system  Outcome: Progressing     Problem: Knowledge Deficit  Goal: Patient/family/caregiver demonstrates understanding of disease process, treatment plan, medications, and discharge instructions  Description: Complete learning assessment and assess knowledge base    Interventions:  - Provide teaching at level of understanding  - Provide teaching via preferred learning methods  Outcome: Progressing     Problem: GENITOURINARY - ADULT  Goal: Maintains or returns to baseline urinary function  Description: INTERVENTIONS:  - Assess urinary function  - Encourage oral fluids to ensure adequate hydration if ordered  - Administer IV fluids as ordered to ensure adequate hydration  - Administer ordered medications as needed  - Offer frequent toileting  - Follow urinary retention protocol if ordered  Outcome: Progressing  Goal: Absence of urinary retention  Description: INTERVENTIONS:  - Assess patients ability to void and empty bladder  - Monitor I/O  - Bladder scan as needed  - Discuss with physician/AP medications to alleviate retention as needed  - Discuss catheterization for long term situations as appropriate  Outcome: Progressing  Goal: Urinary catheter remains patent  Description: INTERVENTIONS:  - Assess patency of urinary catheter  - If patient has a chronic resendiz, consider changing catheter if non-functioning  - Follow guidelines for intermittent irrigation of non-functioning urinary catheter  Outcome: Progressing     Problem: METABOLIC, FLUID AND ELECTROLYTES - ADULT  Goal: Electrolytes maintained within normal limits  Description: INTERVENTIONS:  - Monitor labs and assess patient for signs and symptoms of electrolyte imbalances  - Administer electrolyte replacement as ordered  - Monitor response to electrolyte replacements, including repeat lab results as appropriate  - Instruct patient on fluid and nutrition as appropriate  Outcome: Progressing  Goal: Fluid balance maintained  Description: INTERVENTIONS:  - Monitor labs   - Monitor I/O and WT  - Instruct patient on fluid and nutrition as appropriate  - Assess for signs & symptoms of volume excess or deficit  Outcome: Progressing  Goal: Glucose maintained within target range  Description: INTERVENTIONS:  - Monitor Blood Glucose as ordered  - Assess for signs and symptoms of hyperglycemia and hypoglycemia  - Administer ordered medications to maintain glucose within target range  - Assess nutritional intake and initiate nutrition service referral as needed  Outcome: Progressing     Problem: SKIN/TISSUE INTEGRITY - ADULT  Goal: Skin integrity remains intact  Description: INTERVENTIONS  - Identify patients at risk for skin breakdown  - Assess and monitor skin integrity  - Assess and monitor nutrition and hydration status  - Monitor labs (i e  albumin)  - Assess for incontinence   - Turn and reposition patient  - Assist with mobility/ambulation  - Relieve pressure over bony prominences  - Avoid friction and shearing  - Provide appropriate hygiene as needed including keeping skin clean and dry  - Evaluate need for skin moisturizer/barrier cream  - Collaborate with interdisciplinary team (i e  Nutrition, Rehabilitation, etc )   - Patient/family teaching  Outcome: Progressing  Goal: Incision(s), wounds(s) or drain site(s) healing without S/S of infection  Description: INTERVENTIONS  - Assess and document risk factors for skin impairment   - Assess and document dressing, incision, wound bed, drain sites and surrounding tissue  - Consider nutrition services referral as needed  - Oral mucous membranes remain intact  - Provide patient/ family education  Outcome: Progressing  Goal: Oral mucous membranes remain intact  Description: INTERVENTIONS  - Assess oral mucosa and hygiene practices  - Implement preventative oral hygiene regimen  - Implement oral medicated treatments as ordered  - Initiate Nutrition services referral as needed  Outcome: Progressing     Problem: MUSCULOSKELETAL - ADULT  Goal: Maintain or return mobility to safest level of function  Description: INTERVENTIONS:  - Assess patient's ability to carry out ADLs; assess patient's baseline for ADL function and identify physical deficits which impact ability to perform ADLs (bathing, care of mouth/teeth, toileting, grooming, dressing, etc )  - Assess/evaluate cause of self-care deficits   - Assess range of motion  - Assess patient's mobility  - Assess patient's need for assistive devices and provide as appropriate  - Encourage maximum independence but intervene and supervise when necessary  - Involve family in performance of ADLs  - Assess for home care needs following discharge   - Consider OT consult to assist with ADL evaluation and planning for discharge  - Provide patient education as appropriate  Outcome: Progressing  Goal: Maintain proper alignment of affected body part  Description: INTERVENTIONS:  - Support, maintain and protect limb and body alignment  - Provide patient/ family with appropriate education  Outcome: Progressing     Problem: Nutrition/Hydration-ADULT  Goal: Nutrient/Hydration intake appropriate for improving, restoring or maintaining nutritional needs  Description: Monitor and assess patient's nutrition/hydration status for malnutrition  Collaborate with interdisciplinary team and initiate plan and interventions as ordered  Monitor patient's weight and dietary intake as ordered or per policy  Utilize nutrition screening tool and intervene as necessary  Determine patient's food preferences and provide high-protein, high-caloric foods as appropriate       INTERVENTIONS:  - Monitor oral intake, urinary output, labs, and treatment plans  - Assess nutrition and hydration status and recommend course of action  - Evaluate amount of meals eaten  - Assist patient with eating if necessary   - Allow adequate time for meals  - Recommend/ encourage appropriate diets, oral nutritional supplements, and vitamin/mineral supplements  - Order, calculate, and assess calorie counts as needed  - Recommend, monitor, and adjust tube feedings and TPN/PPN based on assessed needs  - Assess need for intravenous fluids  - Provide specific nutrition/hydration education as appropriate  - Include patient/family/caregiver in decisions related to nutrition  Outcome: Progressing

## 2020-09-22 NOTE — CONSULTS
Consultation - Palliative and Supportive Care   Florentino Flood 80 y o  female [id number]    Assessment:  Patient Active Problem List   Diagnosis    Acute kidney injury superimposed on chronic kidney disease ([facility] [address] )    GERD (gastroesophageal reflux disease)    HTN (hypertension)    Lymphedema    Atrial fibrillation (HCC)    Acute metabolic encephalopathy    Anemia due to chronic kidney disease    Chronic combined systolic and diastolic congestive heart failure (HCC)    Hyponatremia    Hypercalcemia    Diarrhea   Goals of care counseling    Goals:  Level 3 code status  Disease focused care  Will continue discussions regarding [address] as patient's clinical presentation evolves  Patient defers decision making to her daughter  Wound never agree to dialysis but agreeable to all other medical interventions at this time  Family is not ready for hospice at this time  Will return to [facility] for LTC  Plan:  Symptom management:  Diarrhea  C-diff pending    Social support:   Time spent providing supportive listening   Family is extremely stressed out over care at [facility]  Usman Selby Don't feel hospice is appropriate at this time as they wish to have her return to the hospital for acute issues  I have reviewed the patient's controlled substance dispensing history in the Prescription Drug Monitoring Program in compliance with the [facility] regulations before prescribing any controlled substances  Decisional apparatus:  Patient is competent on my exam today  If competence is lost, patient's substitute decision maker would default to daughter Carol Roman by [place] Act 169  We appreciate the invitation to be involved in this patient's care  We will continue to follow throughout this hospitalization  Please do not hesitate to reach our on call provider through our clinic answering service at  should you have acute symptom control concerns      Viviane ALFONSO  Palliative and Supportive Care  Clinic/Answering Service: [phone]  You can find me on Adriana! IDENTIFICATION:  Inpatient consult to Palliative Care  Consult performed by: KADEN Pina  Consult ordered by: Castillo Sim MD        Physician Requesting Consult: Chris De Leon MD  Reason for Consult / Principal Problem: [address] counseling and SM secondary to CHF and CKD        History of Present Illness:  Tracy Angelo is a 80 y o  female who presents with a palliative diagnosis of CHF and CKD 3 with further concern regarding progressive renal failure  She was brought to the ED via EMS from [facility]  As per family, patient has been having urinary incontinence for the lpast 2 days  She has also had poor PO intake with malnutrition and dehydration  She has not taken her medications for the past few days  Family requested patient be sent to ED for concerning kidney function and consider options of palliative and hospice  Family does not want dialysis for the patient (not being recommended at this as CKD is at a level 3)  Patient is seen lying in bed  She is awake, alert and pleasant  She reports feeling "ok except for diarrhea" which just started  C-diff is pending  She denies pain, anxiety or depression  She is eating and drinking well  Discussed expected disease trajectory and prognosis with patient, daughter and granddaughter  Hospice liaison present  Explained disease focused care versus comfort care and hospice  Explained these topics in great detail  Allowed family members time to express concerns, share their feelings about this and ask questions  At this time they request to remain disease focused as they wish to continue to treat illnesses as they arise, they would want her to return to the hospital, and they definitely want IV fluids if needed  They know they would never agree to dialysis should her kidney function continue to decline      ROS  All other systems are negative    Past Medical History:   Diagnosis Date    Chronic kidney disease     GERD (gastroesophageal reflux disease)     Hyperlipidemia     Hypertension     S/P coronary artery bypass graft x 5     Stroke [facility])      Past Surgical History:   Procedure Laterality Date    FRACTURE SURGERY       Social History     Socioeconomic History    Marital status: /Civil Union     Spouse name: Not on file    Number of children: Not on file    Years of education: Not on file    Highest education level: Not on file   Occupational History    Not on file   Social Needs    Financial resource strain: Not on file    Food insecurity     Worry: Not on file     Inability: Not on file   [employer] needs     Medical: Not on file     Non-medical: Not on file   Tobacco Use    Smoking status: Never Smoker    Smokeless tobacco: Never Used   Substance and Sexual Activity    Alcohol use: No    Drug use: No    Sexual activity: Not on file   Lifestyle    Physical activity     Days per week: Not on file     Minutes per session: Not on file    Stress: Not on file   Relationships    Social connections     Talks on phone: Not on file     Gets together: Not on file     Attends [employer] service: Not on file     Active member of club or organization: Not on file     Attends meetings of clubs or organizations: Not on file     Relationship status: Not on file    Intimate partner violence     Fear of current or ex partner: Not on file     Emotionally abused: Not on file     Physically abused: Not on file     Forced sexual activity: Not on file   Other Topics Concern    Not on file   Social History Narrative    Not on file     Family History   Family history unknown: Yes     Medications:  all current active meds have been reviewed and current meds:   Current Facility-Administered Medications   Medication Dose Route Frequency    apixaban (ELIQUIS) tablet 2 5 mg  2 5 mg Oral BID    furosemide (LASIX) injection 40 mg  40 mg Intravenous BID (diuretic)    metoprolol tartrate (LOPRESSOR) tablet 50 mg  50 mg Oral BID    pantoprazole (PROTONIX) EC tablet 40 mg  40 mg Oral Early Morning       No Known Allergies    Objective:  /80 (BP Location: Right arm)   Pulse 80   Temp 98 2 °F (36 8 °C) (Oral)   Resp 19   Ht 5' 2" (1 575 m)   Wt 76 3 kg (168 lb 3 4 oz)   SpO2 95%   BMI 30 77 kg/m²   Physical Exam:  Constitutional: Appears well-developed and well-nourished  In no acute distress  Head: Normocephalic and atraumatic  Eyes: EOM are normal  No ocular discharge  No scleral icterus  Neck: no visible adenopathy or masses  Respiratory: Effort normal  No stridor  No respiratory distress  Gastrointestinal: No abdominal distension  Musculoskeletal: No edema  Neurological: Alert, oriented and appropriately conversant  Competent and clear in her decisions  Skin: Dry, no diaphoresis  Psychiatric: Displays a normal mood and affect  Behavior, judgement and thought content appear normal      Lab Results:   I have personally reviewed pertinent labs  , CBC:   Lab Results   Component Value Date    WBC 6 75 09/22/2020    HGB 9 3 (L) 09/22/2020    HCT 30 7 (L) 09/22/2020    MCV 73 (L) 09/22/2020     09/22/2020    MCH 22 2 (L) 09/22/2020    MCHC 30 3 (L) 09/22/2020    RDW 19 8 (H) 09/22/2020    MPV 10 7 09/22/2020    NRBC 0 09/22/2020   , CMP:   Lab Results   Component Value Date    SODIUM 134 (L) 09/22/2020    K 3 6 09/22/2020    CL 94 (L) 09/22/2020    CO2 32 09/22/2020     (H) 09/22/2020    CREATININE 2 38 (H) 09/22/2020    CALCIUM 9 8 09/22/2020    AST 55 (H) 09/21/2020    ALT 16 09/21/2020    ALKPHOS 157 (H) 09/21/2020    EGFR 18 09/22/2020     Counseling / Coordination of Care  Total floor / unit time spent today 70 minutes  Greater than 50% of total time was spent with the patient and / or family counseling and / or coordination of care   A description of the counseling / coordination of care: Introduced role of Palliative Medicine  Reviewed expected disease trajectory, prognosis, code status, and comfort care versus disease directed therapy  Spent time supportive listening regarding frustrations of diagnosis and treatment options available at this time        Juan Rodriguez, [address] [facility]

## 2020-09-22 NOTE — CASE MANAGEMENT
Cm received consult to make a hospice referral   Cm reviewed with palliative care as both consults were made at the same time  Palliative care requested referral be made in order to coordinate with hospice  Referral has been made  Pt is from  where she has been since January 2020  Cm will continue to follow to assist with needs and planning

## 2020-09-22 NOTE — PLAN OF CARE
Problem: INFECTION - ADULT  Goal: Absence or prevention of progression during hospitalization  Description: INTERVENTIONS:  - Assess and monitor for signs and symptoms of infection  - Monitor lab/diagnostic results  - Monitor all insertion sites, i e  indwelling lines, tubes, and drains  - Monitor endotracheal if appropriate and nasal secretions for changes in amount and color  - Snyder appropriate cooling/warming therapies per order  - Administer medications as ordered  - Instruct and encourage patient and family to use good hand hygiene technique  - Identify and instruct in appropriate isolation precautions for identified infection/condition  Outcome: Progressing  Goal: Absence of fever/infection during neutropenic period  Description: INTERVENTIONS:  - Monitor WBC    Outcome: Progressing     Problem: SAFETY ADULT  Goal: Patient will remain free of falls  Description: INTERVENTIONS:  - Assess patient frequently for physical needs  -  Identify cognitive and physical deficits and behaviors that affect risk of falls    -  Snyder fall precautions as indicated by assessment   - Educate patient/family on patient safety including physical limitations  - Instruct patient to call for assistance with activity based on assessment  - Modify environment to reduce risk of injury  - Consider OT/PT consult to assist with strengthening/mobility  Outcome: Progressing  Goal: Maintain or return to baseline ADL function  Description: INTERVENTIONS:  -  Assess patient's ability to carry out ADLs; assess patient's baseline for ADL function and identify physical deficits which impact ability to perform ADLs (bathing, care of mouth/teeth, toileting, grooming, dressing, etc )  - Assess/evaluate cause of self-care deficits   - Assess range of motion  - Assess patient's mobility; develop plan if impaired  - Assess patient's need for assistive devices and provide as appropriate  - Encourage maximum independence but intervene and supervise when necessary  - Involve family in performance of ADLs  - Assess for home care needs following discharge   - Consider OT consult to assist with ADL evaluation and planning for discharge  - Provide patient education as appropriate  Outcome: Progressing  Goal: Maintain or return mobility status to optimal level  Description: INTERVENTIONS:  - Assess patient's baseline mobility status (ambulation, transfers, stairs, etc )    - Identify cognitive and physical deficits and behaviors that affect mobility  - Identify mobility aids required to assist with transfers and/or ambulation (gait belt, sit-to-stand, lift, walker, cane, etc )  - Gerry fall precautions as indicated by assessment  - Record patient progress and toleration of activity level on Mobility SBAR; progress patient to next Phase/Stage  - Instruct patient to call for assistance with activity based on assessment  - Consider rehabilitation consult to assist with strengthening/weightbearing, etc   Outcome: Progressing     Problem: DISCHARGE PLANNING  Goal: Discharge to home or other facility with appropriate resources  Description: INTERVENTIONS:  - Identify barriers to discharge w/patient and caregiver  - Arrange for needed discharge resources and transportation as appropriate  - Identify discharge learning needs (meds, wound care, etc )  - Arrange for interpretive services to assist at discharge as needed  - Refer to Case Management Department for coordinating discharge planning if the patient needs post-hospital services based on physician/advanced practitioner order or complex needs related to functional status, cognitive ability, or social support system  Outcome: Progressing     Problem: Knowledge Deficit  Goal: Patient/family/caregiver demonstrates understanding of disease process, treatment plan, medications, and discharge instructions  Description: Complete learning assessment and assess knowledge base    Interventions:  - Provide teaching at level of understanding  - Provide teaching via preferred learning methods  Outcome: Progressing     Problem: GENITOURINARY - ADULT  Goal: Maintains or returns to baseline urinary function  Description: INTERVENTIONS:  - Assess urinary function  - Encourage oral fluids to ensure adequate hydration if ordered  - Administer IV fluids as ordered to ensure adequate hydration  - Administer ordered medications as needed  - Offer frequent toileting  - Follow urinary retention protocol if ordered  Outcome: Progressing  Goal: Absence of urinary retention  Description: INTERVENTIONS:  - Assess patients ability to void and empty bladder  - Monitor I/O  - Bladder scan as needed  - Discuss with physician/AP medications to alleviate retention as needed  - Discuss catheterization for long term situations as appropriate  Outcome: Progressing  Goal: Urinary catheter remains patent  Description: INTERVENTIONS:  - Assess patency of urinary catheter  - If patient has a chronic resendiz, consider changing catheter if non-functioning  - Follow guidelines for intermittent irrigation of non-functioning urinary catheter  Outcome: Progressing     Problem: METABOLIC, FLUID AND ELECTROLYTES - ADULT  Goal: Electrolytes maintained within normal limits  Description: INTERVENTIONS:  - Monitor labs and assess patient for signs and symptoms of electrolyte imbalances  - Administer electrolyte replacement as ordered  - Monitor response to electrolyte replacements, including repeat lab results as appropriate  - Instruct patient on fluid and nutrition as appropriate  Outcome: Progressing  Goal: Fluid balance maintained  Description: INTERVENTIONS:  - Monitor labs   - Monitor I/O and WT  - Instruct patient on fluid and nutrition as appropriate  - Assess for signs & symptoms of volume excess or deficit  Outcome: Progressing  Goal: Glucose maintained within target range  Description: INTERVENTIONS:  - Monitor Blood Glucose as ordered  - Assess for signs and symptoms of hyperglycemia and hypoglycemia  - Administer ordered medications to maintain glucose within target range  - Assess nutritional intake and initiate nutrition service referral as needed  Outcome: Progressing     Problem: SKIN/TISSUE INTEGRITY - ADULT  Goal: Incision(s), wounds(s) or drain site(s) healing without S/S of infection  Description: INTERVENTIONS  - Assess and document risk factors for skin impairment   - Assess and document dressing, incision, wound bed, drain sites and surrounding tissue  - Consider nutrition services referral as needed  - Oral mucous membranes remain intact  - Provide patient/ family education  Outcome: Progressing  Goal: Oral mucous membranes remain intact  Description: INTERVENTIONS  - Assess oral mucosa and hygiene practices  - Implement preventative oral hygiene regimen  - Implement oral medicated treatments as ordered  - Initiate Nutrition services referral as needed  Outcome: Progressing     Problem: MUSCULOSKELETAL - ADULT  Goal: Maintain or return mobility to safest level of function  Description: INTERVENTIONS:  - Assess patient's ability to carry out ADLs; assess patient's baseline for ADL function and identify physical deficits which impact ability to perform ADLs (bathing, care of mouth/teeth, toileting, grooming, dressing, etc )  - Assess/evaluate cause of self-care deficits   - Assess range of motion  - Assess patient's mobility  - Assess patient's need for assistive devices and provide as appropriate  - Encourage maximum independence but intervene and supervise when necessary  - Involve family in performance of ADLs  - Assess for home care needs following discharge   - Consider OT consult to assist with ADL evaluation and planning for discharge  - Provide patient education as appropriate  Outcome: Progressing  Goal: Maintain proper alignment of affected body part  Description: INTERVENTIONS:  - Support, maintain and protect limb and body alignment  - Provide patient/ family with appropriate education  Outcome: Progressing     Problem: Nutrition/Hydration-ADULT  Goal: Nutrient/Hydration intake appropriate for improving, restoring or maintaining nutritional needs  Description: Monitor and assess patient's nutrition/hydration status for malnutrition  Collaborate with interdisciplinary team and initiate plan and interventions as ordered  Monitor patient's weight and dietary intake as ordered or per policy  Utilize nutrition screening tool and intervene as necessary  Determine patient's food preferences and provide high-protein, high-caloric foods as appropriate       INTERVENTIONS:  - Monitor oral intake, urinary output, labs, and treatment plans  - Assess nutrition and hydration status and recommend course of action  - Evaluate amount of meals eaten  - Assist patient with eating if necessary   - Allow adequate time for meals  - Recommend/ encourage appropriate diets, oral nutritional supplements, and vitamin/mineral supplements  - Order, calculate, and assess calorie counts as needed  - Recommend, monitor, and adjust tube feedings and TPN/PPN based on assessed needs  - Assess need for intravenous fluids  - Provide specific nutrition/hydration education as appropriate  - Include patient/family/caregiver in decisions related to nutrition  Outcome: Progressing     Problem: PAIN - ADULT  Goal: Verbalizes/displays adequate comfort level or baseline comfort level  Description: Interventions:  - Encourage patient to monitor pain and request assistance  - Assess pain using appropriate pain scale  - Administer analgesics based on type and severity of pain and evaluate response  - Implement non-pharmacological measures as appropriate and evaluate response  - Consider cultural and social influences on pain and pain management  - Notify physician/advanced practitioner if interventions unsuccessful or patient reports new pain  Outcome: Not Progressing     Problem: SKIN/TISSUE INTEGRITY - ADULT  Goal: Skin integrity remains intact  Description: INTERVENTIONS  - Identify patients at risk for skin breakdown  - Assess and monitor skin integrity  - Assess and monitor nutrition and hydration status  - Monitor labs (i e  albumin)  - Assess for incontinence   - Turn and reposition patient  - Assist with mobility/ambulation  - Relieve pressure over bony prominences  - Avoid friction and shearing  - Provide appropriate hygiene as needed including keeping skin clean and dry  - Evaluate need for skin moisturizer/barrier cream  - Collaborate with interdisciplinary team (i e  Nutrition, Rehabilitation, etc )   - Patient/family teaching  Outcome: Not Progressing     Problem: Prexisting or High Potential for Compromised Skin Integrity  Goal: Skin integrity is maintained or improved  Description: INTERVENTIONS:  - Identify patients at risk for skin breakdown  - Assess and monitor skin integrity  - Assess and monitor nutrition and hydration status  - Monitor labs   - Assess for incontinence   - Turn and reposition patient  - Assist with mobility/ambulation  - Relieve pressure over bony prominences  - Avoid friction and shearing  - Provide appropriate hygiene as needed including keeping skin clean and dry  - Evaluate need for skin moisturizer/barrier cream  - Collaborate with interdisciplinary team   - Patient/family teaching  - Consider wound care consult   Outcome: Not Progressing

## 2020-09-22 NOTE — UTILIZATION REVIEW
Initial Clinical Review    Admission: Date/Time/Statement:   Admission Orders (From admission, onward)     Ordered        09/21/20 1903  Inpatient Admission  Once                   Orders Placed This Encounter   Procedures    Inpatient Admission     Standing Status:   Standing     Number of Occurrences:   1     Order Specific Question:   Admitting Physician     Answer:   Kiersten Kessler [[id number]]     Order Specific Question:   Level of Care     Answer:   Med Surg [[id number]]     Order Specific Question:   Estimated length of stay     Answer:   More than 2 Midnights     Order Specific Question:   Certification     Answer:   I certify that inpatient services are medically necessary for this patient for a duration of greater than two midnights  See H&P and MD Progress Notes for additional information about the patient's course of treatment  ED Arrival Information     Expected Arrival Acuity Means of Arrival Escorted By Service Admission Type    - 9/21/2020 16:51 Urgent Ambulance [facility] Hospitalist Urgent    Arrival Complaint    FATIGUE        Chief Complaint   Patient presents with    Fatigue     [place] [facility] [place] increased fatigue, per EMS pt has been receiving fluids throughout the day      Assessment/Plan: 81 yo female to ED by EMS from Nursing facility admitted as Inpatient due to CHF with worsening YAKOV on CKD  PMHx PMHx of CHF previously on bumex/lasix, Afib on Eliquis, CKD stage 3, CAD s/p Coronary Artery Bypass Graft with 2 day urinary incontinence in the setting of poor PO intake (malnutrition & dehydration), shortness of breath & LE edema   Per family, there was difficulty managing Bumex & Lasix for vol overload with changing doses  For the past few days the pt did not take theses meds & sent to ED for eval with Nephrology & possibly Palliative care  Family does not wish dialysis   IN ED: EKG irregularly irregular; elevated creatinine, BNP, she received B-blocker & Eliquis  Consult Nephrology & Palliative care  Mejias, IV Lasix 40 mg with repeat BMP    9/22/2020 Attending: Mejias intact, POA creatine 2 38, baseline=1 33-1  55  Per Nephrology change to Lasix PO  Due to diarrhea today,  hold x1 Lasix this PM  Nephrology not rec dialysis  Palliative care: spoke with family & do not wish Hospice care  NA restrict, fluid restrict 1500 ml  Since non pt with 4 large episodes of diarrhea with burning around anus  C Diff pending  Monitor  Electrolytes in am    9/22/2020 Nephrology:  YAKOV POA likely secondary to overdiuresis; hold diuretics, consider gentle  ml; rec hold further diuretics today & re eval in am  Chronic Kidney Disease stage III- Baseline creatinine is 1 7 from September 2020 and prior    Patient has not seen a nephrologist  lasix and bumex on home medication list but last month cardiology discontinued lasix and increased bumex to 2mg BID however it is unclear if the patient was receiving this at the nursing home  - lost 20 lbs in 1 month per granddaughter  - CXR 9/21 without volume overload    ED Triage Vitals   Temperature Pulse Respirations Blood Pressure SpO2   09/21/20 1656 09/21/20 1656 09/21/20 1656 09/21/20 1656 09/21/20 1656   97 5 °F (36 4 °C) 84 20 128/73 100 %      Temp Source Heart Rate Source Patient Position - Orthostatic VS BP Location FiO2 (%)   09/21/20 1656 09/21/20 1656 09/21/20 1920 09/21/20 1920 --   Oral Monitor Lying Right arm       Pain Score       09/21/20 1920       No Pain          Wt Readings from Last 1 Encounters:   09/21/20 76 3 kg (168 lb 3 4 oz)     Additional Vital Signs:   Date/Time   Temp   Pulse   Resp   BP   MAP (mmHg)   SpO2   O2 Device   Patient Position - Orthostatic VS    09/22/20 1514   98 5 °F (36 9 °C)   85   16   110/62      99 %   None (Room air)   Lying    09/22/20 0648   98 2 °F (36 8 °C)   80   19   130/80      95 %      Lying    09/22/20 0630                     None (Room air)    09/21/20 2200   97 6 °F (36 4 °C)   96   18   107/58      100 %   None (Room air)   Lying    09/21/20 2130      110Abnormal     16   130/75   93   100 %   None (Room air)   Sitting    09/21/20 2118      104      130/75                09/21/20 2030      106Abnormal     16   131/59   85   100 %   None (Room air)   Sitting    09/21/20 1920      94   16   115/66      100 %   None (Room air)   Lying    09/21/20 1830      88      113/70   85   100 %          09/21/20 1800      92   20   129/63   88   100 %   None (Room air)       09/21/20 1656   97 5 °F (36 4 °C)   84   20   128/73      100 %   None (Room air)          Weights (last 14 days)     Date/Time   Weight   Weight Method   Height    09/21/20 2200   76 3 kg (168 lb 3 4 oz)   Bed scale   5' 2" (1 575 m)       Pertinent Labs/Diagnostic Test Results:   Results from last 7 days   Lab Units 09/21/20  2218   SARS-COV-2  Negative     Results from last 7 days   Lab Units 09/22/20  0435 09/21/20  1718   WBC Thousand/uL 6 75 7 63   HEMOGLOBIN g/dL 9 3* 9 7*   HEMATOCRIT % 30 7* 31 1*   PLATELETS Thousands/uL 350 403*   NEUTROS ABS Thousands/µL 4 17 4 43         Results from last 7 days   Lab Units 09/22/20  0435 09/21/20  1718   SODIUM mmol/L 134* 133*   POTASSIUM mmol/L 3 6 3 6   CHLORIDE mmol/L 94* 92*   CO2 mmol/L 32 31   ANION GAP mmol/L 8 10   BUN mg/dL 101* 96*   CREATININE mg/dL 2 38* 2 38*   EGFR ml/min/1 73sq m 18 18   CALCIUM mg/dL 9 8 9 4     Results from last 7 days   Lab Units 09/21/20  1718   AST U/L 55*   ALT U/L 16   ALK PHOS U/L 157*   TOTAL PROTEIN g/dL 6 2*   ALBUMIN g/dL 2 5*   TOTAL BILIRUBIN mg/dL 0 66         Results from last 7 days   Lab Units 09/22/20  0435 09/21/20  1718   GLUCOSE RANDOM mg/dL 72 83         Results from last 7 days   Lab Units 09/21/20  1718   TROPONIN I ng/mL <0 02         Results from last 7 days   Lab Units 09/21/20  1718   PROTIME seconds 22 5*   INR  1 97*   PTT seconds 39*             Results from last 7 days   Lab Units 09/21/20  1718   LACTIC ACID mmol/L 1 2             Results from last 7 days   Lab Units 09/21/20  1718   NT-PRO BNP pg/mL 14,621*         Results from last 7 days   Lab Units 09/21/20  2047   CLARITY UA  Clear   COLOR UA  Yellow   SPEC GRAV UA  1 010   PH UA  5 5   GLUCOSE UA mg/dl Negative   KETONES UA mg/dl Negative   BLOOD UA  Moderate*   PROTEIN UA mg/dl Negative   NITRITE UA  Positive*   BILIRUBIN UA  Negative   UROBILINOGEN UA E U /dl 0 2   LEUKOCYTES UA  Trace*   WBC UA /hpf 4-10*   RBC UA /hpf 1-2*   BACTERIA UA /hpf Occasional   EPITHELIAL CELLS WET PREP /hpf Occasional   SODIUM UR  40   CREATININE UR mg/dL 38 0     9/21 ekg=Interpretation: abnormal    Rate:     ECG rate assessment: normal    Rhythm:     Rhythm: atrial fibrillation    QRS:     QRS axis:  Normal  ST segments:     ST segments:  Non-specific  T waves:     T waves: non-specific     9/21 cxr=  No acute cardiopulmonary disease            ED Treatment:   Medication Administration from 09/21/2020 1651 to 09/21/2020 2147       Date/Time Order Dose Route Action     09/21/2020 2118 apixaban (ELIQUIS) tablet 2 5 mg 2 5 mg Oral Given     09/21/2020 2118 metoprolol tartrate (LOPRESSOR) tablet 50 mg 50 mg Oral Given        Past Medical History:   Diagnosis Date    Chronic kidney disease     GERD (gastroesophageal reflux disease)     Hyperlipidemia     Hypertension     S/P coronary artery bypass graft x 5     Stroke [facility])      Present on Admission:   Acute kidney injury superimposed on chronic kidney disease ([facility] )   Anemia due to chronic kidney disease   Chronic combined systolic and diastolic congestive heart failure (HCC)   Atrial fibrillation (HCC)   Hyponatremia   Hypercalcemia    Admitting Diagnosis: Fatigue [R53 83]  Dementia ([facility] ) [F03 90]  Generalized weakness [R53 1]  Acute kidney injury superimposed on chronic kidney disease ([facility] ) [N17 9, N18 9]  Acute renal failure superimposed on chronic kidney disease, unspecified CKD stage, unspecified acute renal failure type ([facility] ) [N17 9, N18 9]  Malnutrition, unspecified type ([facility] ) [E46]  Age/Sex: 80 y o  female  Admission Orders:  scd  Real diet & fluid restrict 1500 ml  Scheduled Medications:  apixaban, 2 5 mg, Oral, BID  loperamide, 2 mg, Oral, HS  metoprolol tartrate, 50 mg, Oral, BID  pantoprazole, 40 mg, Oral, Early Morning      Continuous IV Infusions:     PRN Meds:       IP CONSULT TO PALLIATIVE CARE  IP CONSULT TO NEPHROLOGY  IP CONSULT TO HOSPICE    Network Utilization Review Department  [email or web address]  ATTENTION: Please call with any questions or concerns to [phone] and carefully listen to the prompts so that you are directed to the right person  All voicemails are confidential   Agnes Parry all requests for admission clinical reviews, approved or denied determinations and any other requests to dedicated fax number below belonging to the campus where the patient is receiving treatment   List of dedicated fax numbers for the Facilities:  [address] DENIALS (Administrative/Medical Necessity) [phone]   [address] (Maternity/NICU/Pediatrics) [phone]   [place] [phone]   Nicky Swanson [phone]   Sonia Casillas [phone]   Jerrol Balloon [phone]   [facility] [address] [phone]   [facility]  [phone]   [address] [facility]  [address] [facility] [address] [facility] [phone]

## 2020-09-22 NOTE — PLAN OF CARE
Problem: PAIN - ADULT  Goal: Verbalizes/displays adequate comfort level or baseline comfort level  Description: Interventions:  - Encourage patient to monitor pain and request assistance  - Assess pain using appropriate pain scale  - Administer analgesics based on type and severity of pain and evaluate response  - Implement non-pharmacological measures as appropriate and evaluate response  - Consider cultural and social influences on pain and pain management  - Notify physician/advanced practitioner if interventions unsuccessful or patient reports new pain  Outcome: Progressing     Problem: INFECTION - ADULT  Goal: Absence or prevention of progression during hospitalization  Description: INTERVENTIONS:  - Assess and monitor for signs and symptoms of infection  - Monitor lab/diagnostic results  - Monitor all insertion sites, i e  indwelling lines, tubes, and drains  - Monitor endotracheal if appropriate and nasal secretions for changes in amount and color  - [place] appropriate cooling/warming therapies per order  - Administer medications as ordered  - Instruct and encourage patient and family to use good hand hygiene technique  - Identify and instruct in appropriate isolation precautions for identified infection/condition  Outcome: Progressing  Goal: Absence of fever/infection during neutropenic period  Description: INTERVENTIONS:  - Monitor WBC    Outcome: Progressing     Problem: SAFETY ADULT  Goal: Patient will remain free of falls  Description: INTERVENTIONS:  - Assess patient frequently for physical needs  -  Identify cognitive and physical deficits and behaviors that affect risk of falls    -  [place] fall precautions as indicated by assessment   - Educate patient/family on patient safety including physical limitations  - Instruct patient to call for assistance with activity based on assessment  - Modify environment to reduce risk of injury  - Consider OT/PT consult to assist with strengthening/mobility  Outcome: Progressing  Goal: Maintain or return to baseline ADL function  Description: INTERVENTIONS:  -  Assess patient's ability to carry out ADLs; assess patient's baseline for ADL function and identify physical deficits which impact ability to perform ADLs (bathing, care of mouth/teeth, toileting, grooming, dressing, etc )  - Assess/evaluate cause of self-care deficits   - Assess range of motion  - Assess patient's mobility; develop plan if impaired  - Assess patient's need for assistive devices and provide as appropriate  - Encourage maximum independence but intervene and supervise when necessary  - Involve family in performance of ADLs  - Assess for home care needs following discharge   - Consider OT consult to assist with ADL evaluation and planning for discharge  - Provide patient education as appropriate  Outcome: Progressing  Goal: Maintain or return mobility status to optimal level  Description: INTERVENTIONS:  - Assess patient's baseline mobility status (ambulation, transfers, stairs, etc )    - Identify cognitive and physical deficits and behaviors that affect mobility  - Identify mobility aids required to assist with transfers and/or ambulation (gait belt, sit-to-stand, lift, walker, cane, etc )  - [place] fall precautions as indicated by assessment  - Record patient progress and toleration of activity level on Mobility SBAR; progress patient to next Phase/Stage  - Instruct patient to call for assistance with activity based on assessment  - Consider rehabilitation consult to assist with strengthening/weightbearing, etc   Outcome: Progressing     Problem: DISCHARGE PLANNING  Goal: Discharge to home or other facility with appropriate resources  Description: INTERVENTIONS:  - Identify barriers to discharge w/patient and caregiver  - Arrange for needed discharge resources and transportation as appropriate  - Identify discharge learning needs (meds, wound care, etc )  - Arrange for interpretive services to assist at discharge as needed  - Refer to Case Management Department for coordinating discharge planning if the patient needs post-hospital services based on physician/advanced practitioner order or complex needs related to functional status, cognitive ability, or social support system  Outcome: Progressing     Problem: Knowledge Deficit  Goal: Patient/family/caregiver demonstrates understanding of disease process, treatment plan, medications, and discharge instructions  Description: Complete learning assessment and assess knowledge base    Interventions:  - Provide teaching at level of understanding  - Provide teaching via preferred learning methods  Outcome: Progressing     Problem: GENITOURINARY - ADULT  Goal: Maintains or returns to baseline urinary function  Description: INTERVENTIONS:  - Assess urinary function  - Encourage oral fluids to ensure adequate hydration if ordered  - Administer IV fluids as ordered to ensure adequate hydration  - Administer ordered medications as needed  - Offer frequent toileting  - Follow urinary retention protocol if ordered  Outcome: Progressing  Goal: Absence of urinary retention  Description: INTERVENTIONS:  - Assess patients ability to void and empty bladder  - Monitor I/O  - Bladder scan as needed  - Discuss with physician/AP medications to alleviate retention as needed  - Discuss catheterization for long term situations as appropriate  Outcome: Progressing  Goal: Urinary catheter remains patent  Description: INTERVENTIONS:  - Assess patency of urinary catheter  - If patient has a chronic resendiz, consider changing catheter if non-functioning  - Follow guidelines for intermittent irrigation of non-functioning urinary catheter  Outcome: Progressing     Problem: METABOLIC, FLUID AND ELECTROLYTES - ADULT  Goal: Electrolytes maintained within normal limits  Description: INTERVENTIONS:  - Monitor labs and assess patient for signs and symptoms of electrolyte imbalances  - Administer electrolyte replacement as ordered  - Monitor response to electrolyte replacements, including repeat lab results as appropriate  - Instruct patient on fluid and nutrition as appropriate  Outcome: Progressing  Goal: Fluid balance maintained  Description: INTERVENTIONS:  - Monitor labs   - Monitor I/O and WT  - Instruct patient on fluid and nutrition as appropriate  - Assess for signs & symptoms of volume excess or deficit  Outcome: Progressing  Goal: Glucose maintained within target range  Description: INTERVENTIONS:  - Monitor Blood Glucose as ordered  - Assess for signs and symptoms of hyperglycemia and hypoglycemia  - Administer ordered medications to maintain glucose within target range  - Assess nutritional intake and initiate nutrition service referral as needed  Outcome: Progressing     Problem: SKIN/TISSUE INTEGRITY - ADULT  Goal: Skin integrity remains intact  Description: INTERVENTIONS  - Identify patients at risk for skin breakdown  - Assess and monitor skin integrity  - Assess and monitor nutrition and hydration status  - Monitor labs (i e  albumin)  - Assess for incontinence   - Turn and reposition patient  - Assist with mobility/ambulation  - Relieve pressure over bony prominences  - Avoid friction and shearing  - Provide appropriate hygiene as needed including keeping skin clean and dry  - Evaluate need for skin moisturizer/barrier cream  - Collaborate with interdisciplinary team (i e  Nutrition, Rehabilitation, etc )   - Patient/family teaching  Outcome: Progressing  Goal: Incision(s), wounds(s) or drain site(s) healing without S/S of infection  Description: INTERVENTIONS  - Assess and document risk factors for skin impairment   - Assess and document dressing, incision, wound bed, drain sites and surrounding tissue  - Consider nutrition services referral as needed  - Oral mucous membranes remain intact  - Provide patient/ family education  Outcome: Progressing  Goal: Oral mucous membranes remain intact  Description: INTERVENTIONS  - Assess oral mucosa and hygiene practices  - Implement preventative oral hygiene regimen  - Implement oral medicated treatments as ordered  - Initiate Nutrition services referral as needed  Outcome: Progressing     Problem: MUSCULOSKELETAL - ADULT  Goal: Maintain or return mobility to safest level of function  Description: INTERVENTIONS:  - Assess patient's ability to carry out ADLs; assess patient's baseline for ADL function and identify physical deficits which impact ability to perform ADLs (bathing, care of mouth/teeth, toileting, grooming, dressing, etc )  - Assess/evaluate cause of self-care deficits   - Assess range of motion  - Assess patient's mobility  - Assess patient's need for assistive devices and provide as appropriate  - Encourage maximum independence but intervene and supervise when necessary  - Involve family in performance of ADLs  - Assess for home care needs following discharge   - Consider OT consult to assist with ADL evaluation and planning for discharge  - Provide patient education as appropriate  Outcome: Progressing  Goal: Maintain proper alignment of affected body part  Description: INTERVENTIONS:  - Support, maintain and protect limb and body alignment  - Provide patient/ family with appropriate education  Outcome: Progressing     Problem: Nutrition/Hydration-ADULT  Goal: Nutrient/Hydration intake appropriate for improving, restoring or maintaining nutritional needs  Description: Monitor and assess patient's nutrition/hydration status for malnutrition  Collaborate with interdisciplinary team and initiate plan and interventions as ordered  Monitor patient's weight and dietary intake as ordered or per policy  Utilize nutrition screening tool and intervene as necessary  Determine patient's food preferences and provide high-protein, high-caloric foods as appropriate       INTERVENTIONS:  - Monitor oral intake, urinary output, labs, and treatment plans  - Assess nutrition and hydration status and recommend course of action  - Evaluate amount of meals eaten  - Assist patient with eating if necessary   - Allow adequate time for meals  - Recommend/ encourage appropriate diets, oral nutritional supplements, and vitamin/mineral supplements  - Order, calculate, and assess calorie counts as needed  - Recommend, monitor, and adjust tube feedings and TPN/PPN based on assessed needs  - Assess need for intravenous fluids  - Provide specific nutrition/hydration education as appropriate  - Include patient/family/caregiver in decisions related to nutrition  Outcome: Progressing     Problem: Prexisting or High Potential for Compromised Skin Integrity  Goal: Skin integrity is maintained or improved  Description: INTERVENTIONS:  - Identify patients at risk for skin breakdown  - Assess and monitor skin integrity  - Assess and monitor nutrition and hydration status  - Monitor labs   - Assess for incontinence   - Turn and reposition patient  - Assist with mobility/ambulation  - Relieve pressure over bony prominences  - Avoid friction and shearing  - Provide appropriate hygiene as needed including keeping skin clean and dry  - Evaluate need for skin moisturizer/barrier cream  - Collaborate with interdisciplinary team   - Patient/family teaching  - Consider wound care consult   Outcome: Progressing     Problem: Potential for Falls  Goal: Patient will remain free of falls  Description: INTERVENTIONS:  - Assess patient frequently for physical needs  -  Identify cognitive and physical deficits and behaviors that affect risk of falls    -  [place] fall precautions as indicated by assessment   - Educate patient/family on patient safety including physical limitations  - Instruct patient to call for assistance with activity based on assessment  - Modify environment to reduce risk of injury  - Consider OT/PT consult to assist with strengthening/mobility  Outcome: Progressing

## 2020-09-22 NOTE — ASSESSMENT & PLAN NOTE
Since 12pm today pt has had 4 large episodes of diarrhea, pt is also complaining of burning around her anus  - Per nephrology, will hold evening dose of lasix today  - C  Diff toxin PCR was ordered, will follow up on results tomorrow

## 2020-09-22 NOTE — ASSESSMENT & PLAN NOTE
On admission, Patient's creatinine elevated to 2 38  Creatinine for the past three months ranged from 1 33-1  55  Patient most likely has a superimposed YAKOV on CKD  YAKOV are due to volume overload vs malnutrition/dehydration vs obstruction  Patient has a picture of volume overload with bilateral pitting edema on exam but no crackles , patient also has reported poor PO intake, along with recent urinary incontinence  Per family, no dialysis  Recent Labs     09/21/20  1718 09/22/20  0435   CREATININE 2 38* 2 38*       Intake/Output Summary (Last 24 hours) at 9/22/2020 1423  Last data filed at 9/22/2020 1229  Gross per 24 hour   Intake 720 ml   Output 1100 ml   Net -380 ml     Plan:  - Mejias is still in place  - Per nephrology IV Lasix will be switched to PO  In the presence of new onset diarrhea we will hold Lasix evening dose today  Nephology is not recommending dialysis at this time  - Palliative Care: Spoke with patients family and at this time family would not like to pursue Hospice care  Pt has only had 1 admission to the hospital in 6 months    - On renal restrictive diet

## 2020-09-23 ENCOUNTER — TELEPHONE (OUTPATIENT)
Dept: OTHER | Facility: OTHER | Age: 85
End: 2020-09-23

## 2020-09-23 VITALS
OXYGEN SATURATION: 97 % | HEART RATE: 102 BPM | RESPIRATION RATE: 18 BRPM | WEIGHT: 168.21 LBS | DIASTOLIC BLOOD PRESSURE: 62 MMHG | TEMPERATURE: 98.1 F | SYSTOLIC BLOOD PRESSURE: 114 MMHG | HEIGHT: 62 IN | BODY MASS INDEX: 30.95 KG/M2

## 2020-09-23 LAB
ANION GAP SERPL CALCULATED.3IONS-SCNC: 8 MMOL/L (ref 4–13)
BASOPHILS # BLD AUTO: 0.02 THOUSANDS/ΜL (ref 0–0.1)
BASOPHILS NFR BLD AUTO: 0 % (ref 0–1)
BUN SERPL-MCNC: 91 MG/DL (ref 5–25)
C DIFF TOX GENS STL QL NAA+PROBE: NEGATIVE
CALCIUM SERPL-MCNC: 9.3 MG/DL (ref 8.3–10.1)
CHLORIDE SERPL-SCNC: 97 MMOL/L (ref 100–108)
CO2 SERPL-SCNC: 32 MMOL/L (ref 21–32)
CREAT SERPL-MCNC: 2.2 MG/DL (ref 0.6–1.3)
EOSINOPHIL # BLD AUTO: 0.1 THOUSAND/ΜL (ref 0–0.61)
EOSINOPHIL NFR BLD AUTO: 2 % (ref 0–6)
ERYTHROCYTE [DISTWIDTH] IN BLOOD BY AUTOMATED COUNT: 19.8 % (ref 11.6–15.1)
FERRITIN SERPL-MCNC: 33 NG/ML (ref 8–388)
GFR SERPL CREATININE-BSD FRML MDRD: 19 ML/MIN/1.73SQ M
GLUCOSE SERPL-MCNC: 67 MG/DL (ref 65–140)
HCT VFR BLD AUTO: 30.6 % (ref 34.8–46.1)
HGB BLD-MCNC: 9.3 G/DL (ref 11.5–15.4)
IMM GRANULOCYTES # BLD AUTO: 0.02 THOUSAND/UL (ref 0–0.2)
IMM GRANULOCYTES NFR BLD AUTO: 0 % (ref 0–2)
IRON SATN MFR SERPL: 9 %
IRON SERPL-MCNC: 23 UG/DL (ref 50–170)
LYMPHOCYTES # BLD AUTO: 1.29 THOUSANDS/ΜL (ref 0.6–4.47)
LYMPHOCYTES NFR BLD AUTO: 24 % (ref 14–44)
MCH RBC QN AUTO: 22.4 PG (ref 26.8–34.3)
MCHC RBC AUTO-ENTMCNC: 30.4 G/DL (ref 31.4–37.4)
MCV RBC AUTO: 74 FL (ref 82–98)
MONOCYTES # BLD AUTO: 0.57 THOUSAND/ΜL (ref 0.17–1.22)
MONOCYTES NFR BLD AUTO: 11 % (ref 4–12)
NEUTROPHILS # BLD AUTO: 3.39 THOUSANDS/ΜL (ref 1.85–7.62)
NEUTS SEG NFR BLD AUTO: 63 % (ref 43–75)
NRBC BLD AUTO-RTO: 0 /100 WBCS
PLATELET # BLD AUTO: 330 THOUSANDS/UL (ref 149–390)
PMV BLD AUTO: 10.9 FL (ref 8.9–12.7)
POTASSIUM SERPL-SCNC: 3.1 MMOL/L (ref 3.5–5.3)
RBC # BLD AUTO: 4.15 MILLION/UL (ref 3.81–5.12)
SODIUM SERPL-SCNC: 137 MMOL/L (ref 136–145)
TIBC SERPL-MCNC: 267 UG/DL (ref 250–450)
WBC # BLD AUTO: 5.39 THOUSAND/UL (ref 4.31–10.16)

## 2020-09-23 PROCEDURE — 85025 COMPLETE CBC W/AUTO DIFF WBC: CPT | Performed by: PSYCHIATRY & NEUROLOGY

## 2020-09-23 PROCEDURE — 82728 ASSAY OF FERRITIN: CPT | Performed by: PHYSICIAN ASSISTANT

## 2020-09-23 PROCEDURE — 99233 SBSQ HOSP IP/OBS HIGH 50: CPT | Performed by: INTERNAL MEDICINE

## 2020-09-23 PROCEDURE — 83550 IRON BINDING TEST: CPT | Performed by: PHYSICIAN ASSISTANT

## 2020-09-23 PROCEDURE — 83540 ASSAY OF IRON: CPT | Performed by: PHYSICIAN ASSISTANT

## 2020-09-23 PROCEDURE — 80048 BASIC METABOLIC PNL TOTAL CA: CPT | Performed by: PSYCHIATRY & NEUROLOGY

## 2020-09-23 PROCEDURE — 99239 HOSP IP/OBS DSCHRG MGMT >30: CPT | Performed by: INTERNAL MEDICINE

## 2020-09-23 RX ORDER — SODIUM CHLORIDE, SODIUM GLUCONATE, SODIUM ACETATE, POTASSIUM CHLORIDE, MAGNESIUM CHLORIDE, SODIUM PHOSPHATE, DIBASIC, AND POTASSIUM PHOSPHATE .53; .5; .37; .037; .03; .012; .00082 G/100ML; G/100ML; G/100ML; G/100ML; G/100ML; G/100ML; G/100ML
500 INJECTION, SOLUTION INTRAVENOUS ONCE
Status: COMPLETED | OUTPATIENT
Start: 2020-09-23 | End: 2020-09-23

## 2020-09-23 RX ORDER — POTASSIUM CHLORIDE 20 MEQ/1
40 TABLET, EXTENDED RELEASE ORAL ONCE
Status: COMPLETED | OUTPATIENT
Start: 2020-09-23 | End: 2020-09-23

## 2020-09-23 RX ORDER — FERROUS SULFATE TAB EC 324 MG (65 MG FE EQUIVALENT) 324 (65 FE) MG
324 TABLET DELAYED RESPONSE ORAL
Refills: 0
Start: 2020-09-23

## 2020-09-23 RX ORDER — BUMETANIDE 1 MG/1
1 TABLET ORAL DAILY
Refills: 0
Start: 2020-09-28

## 2020-09-23 RX ADMIN — METOPROLOL TARTRATE 50 MG: 50 TABLET, FILM COATED ORAL at 09:26

## 2020-09-23 RX ADMIN — POTASSIUM CHLORIDE 40 MEQ: 1500 TABLET, EXTENDED RELEASE ORAL at 06:13

## 2020-09-23 RX ADMIN — APIXABAN 2.5 MG: 2.5 TABLET, FILM COATED ORAL at 09:24

## 2020-09-23 RX ADMIN — PANTOPRAZOLE SODIUM 40 MG: 40 TABLET, DELAYED RELEASE ORAL at 05:01

## 2020-09-23 RX ADMIN — POTASSIUM CHLORIDE 40 MEQ: 1500 TABLET, EXTENDED RELEASE ORAL at 13:52

## 2020-09-23 RX ADMIN — SODIUM CHLORIDE, SODIUM GLUCONATE, SODIUM ACETATE, POTASSIUM CHLORIDE, MAGNESIUM CHLORIDE, SODIUM PHOSPHATE, DIBASIC, AND POTASSIUM PHOSPHATE 500 ML: .53; .5; .37; .037; .03; .012; .00082 INJECTION, SOLUTION INTRAVENOUS at 13:51

## 2020-09-23 NOTE — PHYSICAL THERAPY NOTE
PHYSICAL THERAPY SCREEN NOTE    Patient Name: Meena Naranjo  JSEVI'Y Date: 9/23/2020     PT orders received, chart review performed  Pt is a bedhold at [facility] where she is A x 1 for bed mobility, and a renae lift for OOB  Per RN staff pt has been A x 1 for rolling during this admission  At this time pt is at her baseline, encourage active participation in bed mobility and use of renae lift w/ nursing staff for [address] [place] [address]  No acute PT needs, will DC from IPPT caseload  Please reconsult if any changes      Hattie Haines, PT, DPT

## 2020-09-23 NOTE — ASSESSMENT & PLAN NOTE
Irregularly irregular rhythm on PE today and found on EKG    Currently asymptomatic    On home Eliquis

## 2020-09-23 NOTE — DISCHARGE SUMMARY
Discharge- Selma Arteaga 6/18/1932, 80 y o  female MRN: [id number]    Unit/Bed#: S -01 Encounter: [id number]    Primary Care Provider: Geovani Giraldo MD   Date and time admitted to hospital: 9/21/2020  4:51 PM        * Acute kidney injury superimposed on chronic kidney disease ([facility] [address] )  Assessment & Plan  On admission, Patient's creatinine elevated to 2 38  Creatinine for the past three months ranged from 1 33-1  55  Patient most likely has a superimposed YAKOV on CKD  YAKOV are due to volume overload vs malnutrition/dehydration vs obstruction  Patient has a picture of volume overload with bilateral pitting edema on exam but no crackles , patient also has reported poor PO intake, along with recent urinary incontinence  Per family, no dialysis  Recent Labs     09/21/20  1718 09/22/20  0435 09/23/20  0443   CREATININE 2 38* 2 38* 2 20*       Intake/Output Summary (Last 24 hours) at 9/23/2020 1254  Last data filed at 9/23/2020 1219  Gross per 24 hour   Intake 540 ml   Output 1340 ml   Net -800 ml     Plan:  - Mejias is still in place  - Per nephrology IV Lasix will be switched to PO  Pt Lasix was held yesterday due to diarrhea  Nephology is not recommending dialysis at this time  - Palliative Care: Spoke with patients family and at this time family would not like to pursue Hospice care  Pt has only had 1 admission to the hospital in 6 months  - On renal restrictive diet  - Creatinine slowly returning to baseline (Today Cr = 2 20, Baseline Cr = 1 5)  - On discharge, Lasix will be discontinued and Bumex will be reduced to 1mg daily    Chronic combined systolic and diastolic congestive heart failure (HCC)  Assessment & Plan  Wt Readings from Last 3 Encounters:   09/21/20 76 3 kg (168 lb 3 4 oz)   07/31/20 76 3 kg (168 lb 3 2 oz)   07/13/20 76 2 kg (168 lb)     Patient has PMHx of CHF  Patient has chronic volume overload with bilateral pitting edema  Patient is on lasix and bumex as home meds   Chronic combined systolic and diastolic CHF in the setting of CHF documented in the H&P with unspecified acuity and type based on provider notes from [address] [facility] (no past ECHO on chart)  Presents with increased shortness of breath and lower extremity edema  On PE pt had picture of volume overload with B/L pitting edema, but no crackles  No ECHO in chart, but last 3 nursing home Provider visits (7/13/20, 7/31/20, 9/3/20) document chronic combined systolic systolic and diastolic CHF  Primary cardiologist: Dr Nani Mcdaniel    Workup:  Lab Results   Component Value Date    NTBNP 14,621 (H) 09/21/2020      Home Cardiac Medications:  o Lasix, Bumex, Eliquis   Physical exam: Patient is currently volume overloaded  Bilateral pitting edema, per family this is chronic and pt LL edema is at baseline   EKG: Irregularly irregular   CXR: No acute cardiopulmonary disease    Plan:   Diuresis: Lasix 40mg bid was held yesterday due to diarrhea   Mejias in place  · Daily weights  Continue to measure I/Os    Intake/Output Summary (Last 24 hours) at 9/23/2020 1254  Last data filed at 9/23/2020 1219  Gross per 24 hour   Intake 540 ml   Output 1340 ml   Net -800 ml     · Monitor on Telemetry  · HF Diet:  · Sodium restricted diet 2g  · Fluid restriction 1500 mL  · Elevate head of bed to at least 30°  · Will discharge pt on Bumex 1mg daily          Diarrhea  Assessment & Plan  Yesterday from 12pm - 2pm, pt has had 4 large episodes of diarrhea, pt is also complaining of burning around her anus  - Per nephrology, yesterday lasix evening dose was held  - C  Diff toxin PCR was negative  - PT diarrhea improved, she is no longer complaining of burning around her anus    Atrial fibrillation [facility])  Assessment & Plan  Irregularly irregular rhythm on PE today and found on EKG    Currently asymptomatic    On home Eliquis        Discharging Resident: Genie Colón DO  Attending: Santana Rosario MD  PCP: Tomer Fuentes MD  Admission Date: 9/21/2020  Discharge Date: 09/23/20    Disposition:      Other: Long term SNF    For Discharges to [facility]:   · [facility] / Dorian Luther [facility] [employer] [facility]     Reason for Admission: Chronic combined systolic and diastolic CHF and YAKOV superimposed on CHF    Consultations During Hospital Stay:  · Nephrology, Palliative Care    Procedures Performed:     · None    Medication Adjustments and Discharge Medications:  · Summary of Medication Adjustments made as a result of this hospitalization: Discontinued Lasix and decreased Bumex to 1mg daily  Added FeSo4 325mg daily to treat iron deficiency  · Medication Dosing Tapers - Please refer to Discharge Medication List for details on any medication dosing tapers (if applicable to patient)  · Medications being temporarily held (include recommended restart time): Bumex was held, resume on Monday 9/28/20  · Discharge Medication List: See after visit summary for reconciled discharge medications  Wound Care Recommendations:  When applicable, please see wound care section of After Visit Summary  Diet Recommendations at Discharge:  Diet -        Diet Orders   (From admission, onward)             Start     Ordered    09/22/20 1012  Diet Renal; Renal Restrictive; Yes; Fluid Restriction 1500 ML; No  Diet effective now     Question Answer Comment   Diet Type Renal    Renal Renal Restrictive    Should patient have a fluid restriction? Yes    Fluid Restriction Fluid Restriction 1500 ML    Should patient have a protein modifier? No    RD to adjust diet per protocol? Yes        09/22/20 1011    09/21/20 2219  Room Service  Once     Question:  Type of Service  Answer:  Room Service - Appropriate with Assistance    09/21/20 2218              Fluid Restriction - No Fluid Restriction at Discharge      Instructions for any Catheters / Lines Present at Discharge (including removal date, if applicable): None    Significant Findings / Test Results:     · COVID19 = negative  · C  Diff PCR = negative  · Hypokalemia = 3 1 (9/23/20)  · Hemoglobin = 9 3 (9/23/20) stable  · Iron = 23  · Chest Xray = No acute cardiopulmonary disease  · EKG = 9/21 showed AFib    Incidental Findings:   · None     Test Results Pending at Discharge (will require follow up): · None     Outpatient Tests Requested:  · None    Complications:  None    Hospital Course: Homer Sunshine is a 80 y o  female with PMHx of chronic combined systolic and diastolic CHF, Afib, HTN who originally presented to the hospital on 9/21/2020 due to poor urine output, poor p o  intake, and fatigue  Per ER Note: 68-year-old female presents emergency department with report fatigue  She comes from [facility] with a report that she is in renal failure and anemia given her IV fluids  Patient does not really know why she is here and denies any symptoms other than being tired and fatigued  She states she has chronic leg swelling  In ED pt received Apixaban 2 5mg, Protonix 40mg, Imodium 2mg, Isolyte 500mL bolus  BMP revealed Cr = 2 38  Baseline Cr = 1 7 (According to nephology)  Chest X-ray was done and showed no acute cardiopulm disease  EKG = showed AFib    On admission a resendiz catheter was placed and released good urine output at this time and was clear, light yellow urine  It was determined that pt was volume overloaded secondary to chronic CHF with concomitant YAKOV  Pt was diuresed with 40mg IV Lasix  Family was at bedside and wanted to speak with Palliative and Hospice Medicine to discuss goals of care, pt is Level 3 code status  Per Palliative eval: At this time family request to remain disease focused as they wish to continue to treat illnesses as they arise, they would want her to return to the hospital, and they definitely want IV fluids if needed  They know they would never agree to dialysis should her kidney function continue to decline      On 9/22 pt developed severe diarrhea and had 4 episodes of watery diarrhea between 2pm - 4pm  A C diff PCR test was collected and came back negative  Lasix was held on 9/22 and Nephology decided to start Isolyte 50cc/hr x 10hrs in the presence of diarrhea  Iron studies were ordered by nephology and Fe = 23  On 9/23, pt did not appear volume overloaded, but does have chronic 3+ LL pitting edema that family states is improved  Explanation for YAKOV at this time was likely secondary to over diuresis, and LLE most likely due to 3rd spacing  Diuretics were held again on this day and the decision was made to discontinue lasix and reduce bumex to 1mg daily to be continued on Monday 9/28/20  Pt was discharge to [address] [place] [address] [address]  today (9/23/20), at this time, no additional in-hospital needs are identified  The patient is cleared for discharge back to her nursing facility  Pt will need to follow up with nephology [facility]) as an outpt on 10/2/2002 at 9:30am, referral has been made  Repeat BMP will be done prior to this appt  Condition at Discharge: stable     Discharge Day Visit / Exam:     Subjective: No acute events overnight  Nurses state bowel movement today was soft but formed  Today pt was pleasant and alert  She was oriented x3 and was able to correctly answer the current present and the name of the hospital  Pt states her diarrhea is much improved and she is no longer experiencing burning around her anus  Pt denies blood in her urine or pain with urinating, denies chest pain, SOB and was looking forward to discharge  Vitals: Blood Pressure: 110/58 (09/23/20 0926)  Pulse: 100 (09/23/20 0926)  Temperature: 98 °F (36 7 °C) (09/23/20 0700)  Temp Source: Oral (09/23/20 0700)  Respirations: 18 (09/23/20 0700)  Height: 5' 2" (157 5 cm) (09/21/20 2200)  Weight - Scale: 76 3 kg (168 lb 3 4 oz) (09/21/20 2200)  SpO2: 98 % (09/23/20 0700)  Exam:   Physical Exam  Vitals signs and nursing note reviewed  Constitutional:       Appearance: Normal appearance   She is obese  She is not ill-appearing, toxic-appearing or diaphoretic  HENT:      Head: Normocephalic and atraumatic  Mouth/Throat:      Mouth: Mucous membranes are moist    Eyes:      General: No scleral icterus  Right eye: No discharge  Left eye: No discharge  Conjunctiva/sclera: Conjunctivae normal    Neck:      Musculoskeletal: Normal range of motion  Cardiovascular:      Rate and Rhythm: Normal rate  Rhythm irregular  Heart sounds: No murmur  No friction rub  Pulmonary:      Effort: Pulmonary effort is normal  No respiratory distress  Breath sounds: Normal breath sounds  No stridor  No wheezing, rhonchi or rales  Chest:      Chest wall: No tenderness  Abdominal:      General: Abdomen is flat  Bowel sounds are normal  There is no distension  Palpations: Abdomen is soft  There is no mass  Tenderness: There is no abdominal tenderness  There is no guarding or rebound  Musculoskeletal: Normal range of motion  General: No tenderness, deformity or signs of injury  Right lower leg: Edema (+3 pitting edema) present  Left lower leg: Edema (+3 pitting edema) present  Skin:     General: Skin is warm  Coloration: Skin is not jaundiced or pale  Findings: No bruising, erythema, lesion or rash  Neurological:      General: No focal deficit present  Mental Status: She is alert and oriented to person, place, and time  Sensory: No sensory deficit  Motor: No weakness  Psychiatric:         Mood and Affect: Mood normal          Behavior: Behavior normal          Judgment: Judgment normal        Discussion with Family: Conversation had with pt daughter Meryle Common, she was made aware of discharge and plan      Goals of Care Discussions:  · Code Status at Discharge: Level 3 - DNAR and DNI  · Were there any Goals of Care Discussions during Hospitalization?: Yes  · Results of any General Goals of Care Discussions: Palliative spoke with family and it was decided, that at this point, Hospice is not necessary  · POLST Completed: No   · If POLST Completed, Summary of POLST Agreement Provided Here: In chart from 2019   · OK to Rehospitalize if Needed? Yes    Discharge instructions/Information to patient and family:   See after visit summary section titled Discharge Instructions for information provided to patient and family        Planned Readmission: None      ** Please Note: This note has been constructed using a voice recognition system **

## 2020-09-23 NOTE — PROGRESS NOTES
NEPHROLOGY PROGRESS NOTE   Shelly Fowler 80 y o  female MRN: [id number]  Unit/Bed#: S -01 Encounter: [id number]  Reason for Consult: YAKOV/CKD    ASSESSMENT/PLAN:  1  Acute Kidney Injury, POA- likely secondary to overdiuresis  - hold diuretics  - received 500ml IVF yesterday, will start IVF at 75ml/hr today until transport picks up patient in a few hours  - recommend holding diuretics until Monday then restart at lower dose of bumex 1mg daily and monitor  - resendiz catheter in place, making urine  - no imaging performed  - avoid hypotension, avoid nephrotoxins  - no acute indication for dialysis  - patient and family do not want dialysis going forward  2  Chronic Kidney Disease stage III- Baseline creatinine is 1 7 from September 2020 and prior  Office appointment set up in [place] for 10/2   3  Diarrhea- C diff negative  - holding diuretics  4  Mild Hypercalcemia- likely secondary to diarrhea & volume depletion  5  Hyponatremia- likely secondary to volume depletion  - improved s/p IVF  - agree with fluid restriction  6  CHF- currently appears compensated but does still have bilateral LE edema improved from outpatient per report  - lasix and bumex on home medication list but last month cardiology discontinued lasix and increased bumex to 2mg BID however it is unclear if the patient was receiving this at the nursing home  - lost 20 lbs in 1 month per granddaughter  - CXR 9/21 without volume overload  - recommend holding diuretics until Monday then restart at lower dose of bumex 1mg daily and monitor  7  Azotemia- consider secondary to overdiuresis  8  Anemia- hgb slightly below baseline  - iron studies low, would benefit from iron supplement  9  Goals of Care- family not ready for hospice    Disposition:  Okay for discharge today  Discharge orders discussed with Dr Sharyle Fare  Office follow up made for 10/2 with BMP  Plan discussed with nurse  SUBJECTIVE:  Patient feeling well overall  Denies SOB    States her legs are sore  Looking forward to going back to nursing facility today  Eating well per her report      OBJECTIVE:  Current Weight: Weight - Scale: 76 3 kg (168 lb 3 4 oz)  Vitals:    09/22/20 2119 09/22/20 2159 09/23/20 0700 09/23/20 0926   BP: 105/54 105/54 105/56 110/58   BP Location:  Left arm Right arm    Pulse:  92 101 100   Resp:  18 18    Temp:  (!) 97 4 °F (36 3 °C) 98 °F (36 7 °C)    TempSrc:  Oral Oral    SpO2:  92% 98%    Weight:       Height:           Intake/Output Summary (Last 24 hours) at 9/23/2020 1435  Last data filed at 9/23/2020 1219  Gross per 24 hour   Intake 540 ml   Output 1340 ml   Net -800 ml     General: NAD  Skin: no rash  Eyes: anicteric  ENMT: mm slightly dry  Neck: no masses  Respiratory: CTAB  Cardiac: RRR  Extremities: + bilateral edema  GI: soft nt nd  Neuro: alert awake  Psych: mood and affect appropriate    Medications:    Current Facility-Administered Medications:     apixaban (ELIQUIS) tablet 2 5 mg, 2 5 mg, Oral, BID, Luis Parameswaran, DO, 2 5 mg at 09/23/20 4200    loperamide (IMODIUM) capsule 2 mg, 2 mg, Oral, HS, KADEN Castellanos, Stopped at 09/22/20 2121    metoprolol tartrate (LOPRESSOR) tablet 50 mg, 50 mg, Oral, BID, Luis Parameswaran, DO, 50 mg at 09/23/20 0926    multi-electrolyte (ISOLYTE-S PH 7 4) bolus 500 mL, 500 mL, Intravenous, Once, Snadra Mancia PA-C, Last Rate: 200 mL/hr at 09/23/20 1351, 500 mL at 09/23/20 1351    pantoprazole (PROTONIX) EC tablet 40 mg, 40 mg, Oral, Early Morning, Luis Parameswaran, DO, 40 mg at 09/23/20 0501    Laboratory Results:  Results from last 7 days   Lab Units 09/23/20  0443 09/22/20  0435 09/21/20  1718   WBC Thousand/uL 5 39 6 75 7 63   HEMOGLOBIN g/dL 9 3* 9 3* 9 7*   HEMATOCRIT % 30 6* 30 7* 31 1*   PLATELETS Thousands/uL 330 350 403*   POTASSIUM mmol/L 3 1* 3 6 3 6   CHLORIDE mmol/L 97* 94* 92*   CO2 mmol/L 32 32 31   BUN mg/dL 91* 101* 96*   CREATININE mg/dL 2 20* 2 38* 2 38*   CALCIUM mg/dL 9 3 9 8 9 4

## 2020-09-23 NOTE — DISCHARGE INSTR - AVS FIRST PAGE
Dear Candice Noland,     It was our pleasure to care for you here at [facility] [place]  It is our hope that we were always able to exceed the expected standards for your care during your stay  You were hospitalized due to worsening kidney function  You were cared for on the floor by Neelam Way DO under the service of Zelda Avendano MD with the [facility] who covers for your primary care physician (PCP), Bhupendra Singh MD, while you were hospitalized  If you have any questions or concerns related to this hospitalization, you may contact us at [phone]  For follow up as well as any medication refills, we recommend that you follow up with your primary care physician  A registered nurse will reach out to you by phone within a few days after your discharge to answer any additional questions that you may have after going home  However, at this time we provide for you here, the most important instructions / recommendations at discharge:     · Notable Medication Adjustments -   · Lasix was discontinued  · Bumex dose decrease to 1 mg daily with resumption date on Monday 9/28/20  · Testing Required after Discharge -   · Repeat BMP as ordered per Nephrology  · Important follow up information -   · We would like you to follow up with Nephrology as scheduled  · We would like you to follow-up with your primary care physician within 1-2 weeks  · Other Instructions -   · Remember to social distance and wear your mask  · Please review this entire after visit summary as additional general instructions including medication list, appointments, activity, diet, any pertinent wound care, and other additional recommendations from your care team that may be provided for you        Sincerely,     Neelam Way DO

## 2020-09-23 NOTE — OCCUPATIONAL THERAPY NOTE
Occupational Therapy Screen Note        Patient Name: Selma Arteaga  [id number] Date: 9/23/2020      OT orders received  Chart received performed  Based on PT's conversation with nursing from [address] [place] [address], pt is a resident at [address] [place] [address] where she is Ax1 for bed mobility and a renae lift for OOB  At baseline pt is an Ax1 for ADL tasks  Per conversation with nursing staff [facility] at [facility], pt appears to be performing at functional baseline,  Pt does not demonstrate need for skilled OT at this time  Pt will not be seen further by OT services  DC OT orders        Darin Marrufo, OTR/L

## 2020-09-23 NOTE — DISCHARGE INSTRUCTIONS
Chronic Kidney Disease   WHAT YOU NEED TO KNOW:   Chronic kidney disease (CKD) is the gradual and permanent loss of kidney function  It is also called chronic kidney failure, or chronic renal insufficiency  Normally, the kidneys remove fluid, chemicals, and waste from your blood  These wastes are turned into urine by your kidneys  CKD may worsen over time and lead to kidney failure  DISCHARGE INSTRUCTIONS:   Seek care immediately if:   · You are confused and very drowsy  · You have a seizure  · You have shortness of breath  Contact your healthcare provider if:   · You suddenly gain or lose more weight than your healthcare provider has told you is okay  · You have itchy skin or a rash  · You urinate more or less than you normally do  · You have blood in your urine  · You have nausea and repeated vomiting  · You have fatigue or muscle weakness  · You have hiccups that will not stop  · You have questions or concerns about your condition or care  Medicines:   · Medicines  may be given to decrease blood pressure and get rid of extra fluid  You may also receive medicine to manage health conditions that may occur with CKD, such as anemia, diabetes, and heart disease  · Take your medicine as directed  Contact your healthcare provider if you think your medicine is not helping or if you have side effects  Tell him or her if you are allergic to any medicine  Keep a list of the medicines, vitamins, and herbs you take  Include the amounts, and when and why you take them  Bring the list or the pill bottles to follow-up visits  Carry your medicine list with you in case of an emergency  Follow up with your healthcare provider as directed: You will need to return for tests to monitor your kidney function  You may also be referred to a kidney specialist  Write down your questions so you remember to ask them during your visits  Manage other health conditions:   Follow your healthcare provider's directions on how to manage diabetes, high blood pressure, and heart disease  These conditions can make CKD worse  Talk to your healthcare provider before you take over-the-counter medicine  Medicines such as NSAIDs, stomach medicine, or laxatives may harm your kidneys  Weigh yourself daily:  Ask your healthcare provider what your weight should be  Ask how much liquid you should drink each day  CKD may cause you to gain or lose weight rapidly  Weigh yourself every day  Write down your weight, how much liquid you drink or eat, and how much you urinate each day  Contact your healthcare provider if your weight is higher or lower than it should be  Manage CKD:   · Maintain a healthy weight  Ask your healthcare provider how much you should weigh  Ask him to help you create a weight loss plan if you are overweight  · Exercise 30 to 60 minutes a day, 4 to 7 times a week, or as directed  Ask about the best exercise plan for you  Regular exercise can help you manage CKD, high blood pressure, and diabetes  · Follow your healthcare provider's advice about what to eat and drink  He may tell you to eat food low in sodium (salt), potassium, phosphorus, or protein  You may need to see a dietitian if you need help planning meals  Ask how much liquid to drink each day and which liquids are best for you  · Limit alcohol  Ask how much alcohol is safe for you to drink  A drink of alcohol is 12 ounces of beer, 5 ounces of wine, or 1½ ounces of liquor  · Do not smoke  Nicotine and other chemicals in cigarettes and cigars can cause lung and kidney damage  Ask your healthcare provider for information if you currently smoke and need help to quit  E-cigarettes or smokeless tobacco still contain nicotine  Talk to your healthcare provider before you use these products  · Ask your healthcare provider if you need vaccines    Infections such as pneumonia, influenza, and hepatitis can be more harmful or more likely to occur in a person who has CKD  Vaccines reduce your risk of infection with these viruses  © 2017 [employer]  Information is for End User's use only and may not be sold, redistributed or otherwise used for commercial purposes  All illustrations and images included in CareNotes® are the copyrighted property of A D A M , Inc  or Luis A Patterson  The above information is an  only  It is not intended as medical advice for individual conditions or treatments  Talk to your doctor, nurse or pharmacist before following any medical regimen to see if it is safe and effective for you  Heart Failure   WHAT YOU NEED TO KNOW:   Heart failure (HF) is a condition that does not allow your heart to fill or pump properly  Not enough oxygen in your blood gets to your organs and tissues  HF can occur in the right side, the left side, or both lower chambers of your heart  HF is often caused by damage or injury to your heart  The damage may be caused by heart attack, other heart conditions, or high blood pressure  HF is a long-term condition that tends to get worse over time  It is important to manage your health to improve your quality of life  HF can be worsened by heavy alcohol use, smoking, diabetes that is not controlled, or obesity  DISCHARGE INSTRUCTIONS:   Call 911 if:   · You have any of the following signs of a heart attack:      ¨ Squeezing, pressure, or pain in your chest that lasts longer than 5 minutes or returns    ¨ Discomfort or pain in your back, neck, jaw, stomach, or arm     ¨ Trouble breathing    ¨ Nausea or vomiting    ¨ Lightheadedness or a sudden cold sweat, especially with chest pain or trouble breathing    Seek care immediately if:   · You gain 3 or more pounds (1 4 kg) in a day, or more than your healthcare provider says you should  · Your heartbeat is fast, slow, or uneven all the time    Contact your healthcare provider if:   · You have symptoms of worsening HF:      ¨ Shortness of breath at rest, at night, or that is getting worse in any way     ¨ Weight gain of 5 or more pounds (2 2 kg) in a week     ¨ More swelling in your legs or ankles     ¨ Abdominal pain or swelling     ¨ More coughing     ¨ Loss of appetite     ¨ Feeling tired all the time    · You feel hopeless or depressed, or you have lost interest in things you used to enjoy  · You often feel worried or afraid  · You have questions or concerns about your condition or care  Medicines: You may  need any of the following:  · Medicines  may be given to help regulate your heart rhythm  You may also need medicines to lower your blood pressure, and to get rid of extra fluids  · Take your medicine as directed  Contact your healthcare provider if you think your medicine is not helping or if you have side effects  Tell him or her if you are allergic to any medicine  Keep a list of the medicines, vitamins, and herbs you take  Include the amounts, and when and why you take them  Bring the list or the pill bottles to follow-up visits  Carry your medicine list with you in case of an emergency  Follow up with your healthcare provider or cardiologist as directed: You may need to return for other tests  You may need home health care  A healthcare provider will monitor your vital signs, weight, and make sure your medicines are working  Write down your questions so you remember to ask them during your visits  Go to cardiac rehab as directed:  Cardiac rehab is a program run by specialists who will help you safely strengthen your heart  The program includes exercise, relaxation, stress management, and heart-healthy nutrition  Healthcare providers will also make sure your medicines are helping to reduce your symptoms  Manage your HF:  · Do not smoke  Nicotine and other chemicals in cigarettes and cigars can cause lung damage and make HF difficult to manage   Ask your healthcare provider for information if you currently smoke and need help to quit  E-cigarettes or smokeless tobacco still contain nicotine  Talk to your healthcare provider before you use these products  · Do not drink alcohol or take illegal drugs  Alcohol and drugs can worsen your symptoms quickly  · Weigh yourself every morning  Use the same scale, in the same spot  Do this after you use the bathroom, but before you eat or drink anything  Wear the same type of clothing  Do not wear shoes  Record your weight each day so you will notice any sudden weight gain  Swelling and weight gain are signs of fluid retention  If you are overweight, ask how to lose weight safely  · Check your blood pressure and heart rate every day  Ask for more information about how to measure your blood pressure and heart rate correctly  Ask what these numbers should be for you  · Manage any chronic health conditions you have  These include high blood pressure, diabetes, obesity, high cholesterol, metabolic syndrome, and COPD  You will have fewer symptoms if you manage these health conditions  Follow your healthcare provider's recommendations and follow up with him or her regularly  · Eat heart-healthy foods and limit sodium (salt)  An easy way to do this is to eat more fresh fruits and vegetables and fewer canned and processed foods  Replace butter and margarine with heart-healthy oils such as olive oil and canola oil  Other heart-healthy foods include walnuts, whole-grain breads, low-fat dairy products, beans, and lean meats  Fatty fish such as salmon and tuna are also heart healthy  Ask how much salt you can eat each day  Do not use salt substitutes  · Drink liquids as directed  You may need to limit the amount of liquids you drink if you retain fluid  Ask how much liquid to drink each day and which liquids are best for you  · Stay active  If you are not active, your symptoms are likely to worsen quickly   Walking, bicycling, and other types of physical activity help maintain your strength and improve your mood  Physical activity also helps you manage your weight  Work with your healthcare provider to create an exercise plan that is right for you  · Get vaccines as directed  Get a flu shot every year  You may also need the pneumonia vaccine  The flu and pneumonia can be severe for a person who has HF  Vaccines protect you from these infections  Join a support group:  Living with HF can be difficult  It may be helpful to talk with others who have HF  You may learn how to better manage your condition or get emotional support  For more information:   · [address]  [place] , [place]   Phone: [phone]  Web Address: https://www strong com/  org   © 2017 [employer]berman Information is for End User's use only and may not be sold, redistributed or otherwise used for commercial purposes  All illustrations and images included in CareNotes® are the copyrighted property of A D A M , Inc  or Luis A Patterson  The above information is an  only  It is not intended as medical advice for individual conditions or treatments  Talk to your doctor, nurse or pharmacist before following any medical regimen to see if it is safe and effective for you

## 2020-09-23 NOTE — ASSESSMENT & PLAN NOTE
Wt Readings from Last 3 Encounters:   09/21/20 76 3 kg (168 lb 3 4 oz)   07/31/20 76 3 kg (168 lb 3 2 oz)   07/13/20 76 2 kg (168 lb)     Patient has PMHx of CHF  Patient has chronic volume overload with bilateral pitting edema  Patient is on lasix and bumex as home meds  Chronic combined systolic and diastolic CHF in the setting of CHF documented in the H&P with unspecified acuity and type based on provider notes from [address] [facility] (no past ECHO on chart)  Presents with increased shortness of breath and lower extremity edema  On PE pt had picture of volume overload with B/L pitting edema, but no crackles  No ECHO in chart, but last 3 nursing home Provider visits (7/13/20, 7/31/20, 9/3/20) document chronic combined systolic systolic and diastolic CHF  Primary cardiologist: Dr Mateusz Rice    Workup:  Lab Results   Component Value Date    NTBNP 14,621 (H) 09/21/2020      Home Cardiac Medications:  o Lasix, Bumex, Eliquis   Physical exam: Patient is currently volume overloaded    Bilateral pitting edema, per family this is chronic and pt LL edema is at baseline   EKG: Irregularly irregular   CXR: No acute cardiopulmonary disease    Plan:   Diuresis: Lasix 40mg bid was held yesterday due to diarrhea   Mejias in place  · Daily weights  Continue to measure I/Os    Intake/Output Summary (Last 24 hours) at 9/23/2020 1254  Last data filed at 9/23/2020 1219  Gross per 24 hour   Intake 540 ml   Output 1340 ml   Net -800 ml     · Monitor on Telemetry  · HF Diet:  · Sodium restricted diet 2g  · Fluid restriction 1500 mL  · Elevate head of bed to at least 30°  · Will discharge pt on Bumex 1mg daily

## 2020-09-23 NOTE — TELEPHONE ENCOUNTER
Jordan Hickman from [facility] to get new admit order  Informed caller to call back if they do not receive a call back from a provider within 20-30 minutes

## 2020-09-23 NOTE — CASE MANAGEMENT
Pt is stable for DC to return to [facility] where she is a long term resident and medicaid bed hold  Transportation is needed as pt is bed bound and has multiple unstagable wounds  Cm contacted [facility] and spoke with Gretta Jones who arranged BLS transport with [address] for [address]  Nursing Rahat Kessler), HAILEY Waters), facility and daughter are aware of DC arrangements  LMN completed, given to nurse, emailed to [facility] and placed to be scanned  No further cm interventions needed at this time

## 2020-09-23 NOTE — ASSESSMENT & PLAN NOTE
Yesterday from 12pm - 2pm, pt has had 4 large episodes of diarrhea, pt is also complaining of burning around her anus  - Per nephrology, yesterday lasix evening dose was held  - C  Diff toxin PCR was negative  - PT diarrhea improved, she is no longer complaining of burning around her anus

## 2020-09-23 NOTE — ASSESSMENT & PLAN NOTE
On admission, Patient's creatinine elevated to 2 38  Creatinine for the past three months ranged from 1 33-1  55  Patient most likely has a superimposed YAKOV on CKD  YAKOV are due to volume overload vs malnutrition/dehydration vs obstruction  Patient has a picture of volume overload with bilateral pitting edema on exam but no crackles , patient also has reported poor PO intake, along with recent urinary incontinence  Per family, no dialysis  Recent Labs     09/21/20  1718 09/22/20  0435 09/23/20  0443   CREATININE 2 38* 2 38* 2 20*       Intake/Output Summary (Last 24 hours) at 9/23/2020 1254  Last data filed at 9/23/2020 1219  Gross per 24 hour   Intake 540 ml   Output 1340 ml   Net -800 ml     Plan:  - Mejias is still in place  - Per nephrology IV Lasix will be switched to PO  Pt Lasix was held yesterday due to diarrhea  Nephology is not recommending dialysis at this time  - Palliative Care: Spoke with patients family and at this time family would not like to pursue Hospice care  Pt has only had 1 admission to the hospital in 6 months    - On renal restrictive diet  - Creatinine slowly returning to baseline (Today Cr = 2 20, Baseline Cr = 1 5)  - On discharge, Lasix will be discontinued and Bumex will be reduced to 1mg daily

## 2020-09-24 ENCOUNTER — NURSING HOME VISIT (OUTPATIENT)
Dept: GERIATRICS | Facility: OTHER | Age: 85
End: 2020-09-24
Payer: COMMERCIAL

## 2020-09-24 DIAGNOSIS — N17.9 ACUTE KIDNEY INJURY SUPERIMPOSED ON CHRONIC KIDNEY DISEASE (HCC): Primary | ICD-10-CM

## 2020-09-24 DIAGNOSIS — R53.1 GENERALIZED WEAKNESS: ICD-10-CM

## 2020-09-24 DIAGNOSIS — I50.42 CHRONIC COMBINED SYSTOLIC AND DIASTOLIC CONGESTIVE HEART FAILURE (HCC): Chronic | ICD-10-CM

## 2020-09-24 DIAGNOSIS — N18.9 ACUTE KIDNEY INJURY SUPERIMPOSED ON CHRONIC KIDNEY DISEASE (HCC): Primary | ICD-10-CM

## 2020-09-24 DIAGNOSIS — I48.91 ATRIAL FIBRILLATION, UNSPECIFIED TYPE (HCC): ICD-10-CM

## 2020-09-24 DIAGNOSIS — G93.41 ACUTE METABOLIC ENCEPHALOPATHY: ICD-10-CM

## 2020-09-24 PROCEDURE — 99305 1ST NF CARE MODERATE MDM 35: CPT | Performed by: FAMILY MEDICINE

## 2020-09-24 NOTE — UTILIZATION REVIEW
Notification of Discharge  This is a Notification of Discharge from our facility [address]  Please be advised that this patient has been discharge from our facility  Below you will find the admission and discharge date and time including the patients disposition  PRESENTATION DATE: 9/21/2020  4:51 PM  OBS ADMISSION DATE:   IP ADMISSION DATE: 9/21/20 1903   DISCHARGE DATE: 9/23/2020  4:33 PM  DISPOSITION: Non [facility] Non [facility]   Admission Orders listed below:  Admission Orders (From admission, onward)     Ordered        09/21/20 1903  Inpatient Admission  Once                   Please contact the UR Department if additional information is required to close this patient's authorization/case  [address] Utilization Review Department  Main: [phone] x carefully listen to the prompts  All voicemails are confidential   [email or web address]  Send all requests for admission clinical reviews, approved or denied determinations and any other requests to dedicated fax number below belonging to the campus where the patient is receiving treatment   List of dedicated fax numbers:  [address] DENIALS (Administrative/Medical Necessity) [phone]   [address] (Maternity/NICU/Pediatrics) [phone]   Shelly Flatten [phone]   Skyler Reynolds [phone]   Doreen Benedict [phone]   145 [facility] [address] [place] [address] [phone]   [facility]  [phone]   [address] [facility]  [facility] [address] [facility] [phone]

## 2020-09-24 NOTE — PROGRESS NOTES
[address]  [address] [facility] 27 [facility], [address] [facility] 31  History and Physical    NAME: Meena Naranjo  AGE: 80 y o  SEX: female [id number]    DATE OF ENCOUNTER: 9/25/2020    Code status:  No CPR    Assessment and Plan     1  Acute kidney injury superimposed on chronic kidney disease ([address] )     - improving     - encourage po hydration as ordered  - monitor BUN/CR     - monitor urine out put     - restart Kcl 20 meq po qd    2  Acute metabolic encephalopathy     - improving     - assistance with ADLs  3  Atrial fibrillation, unspecified type (HCC)     - rate controlled     - cont Apixaban 2 5 mg po bid      - cont Metoprolol 50 mg po bid    4  Chronic combined systolic and diastolic congestive heart failure (HCC)/HTN     - cont Metoprolol 50 mg po bid      - cont Bumetanide 1 mg po qd    5  Generalized weakness      - PT/OT ordered      - encourage po intake  BMP ordered  All medications and routine orders were reviewed and updated as needed  Plan discussed with: patient and staff    Chief Complaint     Seen for admission at [place] [address], a 79 y/o female sent to the hospital with decreased urine out put and elevated BUN/Cr  (Cr baseline 1 7, on adm 2 3)  She was given Isolyte, CXR showed no acute abnormalities, EKG showed A Fib  A resendiz catheter was placed with good urine out put  Cardiology and Nephrology on case  She was diagnosed with volume overload, CHF exacerbation and YAKOV  Palliative care was consulted  She was discharged back to the facility for further stay  She was seen and examined at bedside, stable  She is lying comfortably in bed, sleepy, minimally verbal  She needs max assistance with ADLs  She can feed herself with setup  Staff has no c/o at this time       HISTORY:  Past Medical History:   Diagnosis Date    Chronic kidney disease     GERD (gastroesophageal reflux disease)     Hyperlipidemia     Hypertension     S/P coronary artery bypass graft x 5     Stroke [facility])      Family History   Family history unknown: Yes     Social History     Socioeconomic History    Marital status: /Civil Union     Spouse name: None    Number of children: None    Years of education: None    Highest education level: None   Occupational History    None   Social Needs    Financial resource strain: None    Food insecurity     Worry: None     Inability: None    Transportation needs     Medical: None     Non-medical: None   Tobacco Use    Smoking status: Never Smoker    Smokeless tobacco: Never Used   Substance and Sexual Activity    Alcohol use: No    Drug use: No    Sexual activity: None   Lifestyle    Physical activity     Days per week: None     Minutes per session: None    Stress: None   Relationships    Social connections     Talks on phone: None     Gets together: None     Attends Sikh service: None     Active member of club or organization: None     Attends meetings of clubs or organizations: None     Relationship status: None    Intimate partner violence     Fear of current or ex partner: None     Emotionally abused: None     Physically abused: None     Forced sexual activity: None   Other Topics Concern    None   Social History Narrative    None       Allergies:  No Known Allergies    Review of Systems     Review of Systems   Unable to perform ROS: Dementia   Constitutional: Positive for fatigue  Musculoskeletal: Positive for gait problem  Neurological: Positive for weakness  As in HPI  Medications and orders     All medications reviewed and updated in Nursing Home EMR  Objective     Vitals: T: 97 9, P: 69, R: 16, BP: 141/68, 97% on RA, Wt: 145 lbs    Physical Exam  Vitals signs and nursing note reviewed  Constitutional:       General: She is not in acute distress  Appearance: Normal appearance  She is well-developed and normal weight   She is not diaphoretic  HENT:      Head: Normocephalic and atraumatic  Nose: Nose normal       Mouth/Throat:      Mouth: Mucous membranes are moist       Pharynx: Oropharynx is clear  No oropharyngeal exudate  Eyes:      General: No scleral icterus  Right eye: No discharge  Left eye: No discharge  Extraocular Movements: Extraocular movements intact  Conjunctiva/sclera: Conjunctivae normal    Neck:      Musculoskeletal: Normal range of motion and neck supple  Cardiovascular:      Rate and Rhythm: Normal rate and regular rhythm  Heart sounds: Normal heart sounds  No murmur  No friction rub  No gallop  Pulmonary:      Effort: Pulmonary effort is normal  No respiratory distress  Breath sounds: Normal breath sounds  No wheezing or rales  Chest:      Chest wall: No tenderness  Abdominal:      General: Bowel sounds are normal       Palpations: Abdomen is soft  Tenderness: There is no abdominal tenderness  There is no guarding or rebound  Musculoskeletal: Normal range of motion  General: No tenderness or deformity  Right lower leg: Edema present  Left lower leg: Edema present  Comments: B/l LE edema, especially both feet are swollen  Skin:     General: Skin is warm and dry  Neurological:      General: No focal deficit present  Mental Status: She is alert and oriented to person, place, and time  Mental status is at baseline  Cranial Nerves: No cranial nerve deficit  Psychiatric:         Mood and Affect: Mood normal          Behavior: Behavior normal          Pertinent Laboratory/Diagnostic Studies: The following labs/studies were reviewed please see chart or hospital paperwork for details    Ref Range & Units  9/23/20 0443    Sodium  136 - 145 mmol/L  137    Potassium  3 5 - 5 3 mmol/L  3 1Low      Chloride  100 - 108 mmol/L  97Low      CO2  21 - 32 mmol/L  32    ANION GAP  4 - 13 mmol/L  8    BUN  5 - 25 mg/dL  91High      Creatinine 0 60 - 1 30 mg/dL  2  20High      Comment: Standardized to IDMS reference method    Glucose  65 - 140 mg/dL  67       Calcium  8 3 - 10 1 mg/dL  9 3    eGFR  ml/min/1 73sq m  19      Ref Range & Units  9/23/20 0443    WBC  4 31 - 10 16 Thousand/uL  5 39    RBC  3 81 - 5 12 Million/uL  4 15    Hemoglobin  11 5 - 15 4 g/dL  9 3Low      Hematocrit  34 8 - 46 1 %  30 6Low      MCV  82 - 98 fL  74Low      MCH  26 8 - 34 3 pg  22 4Low      MCHC  31 4 - 37 4 g/dL  30 4Low      RDW  11 6 - 15 1 %  19  8High      MPV  8 9 - 12 7 fL  10 9    Platelets  685 - 575 Thousands/uL  330    nRBC  /100 WBCs  0    Neutrophils Relative  43 - 75 %  63    Immat GRANS %  0 - 2 %  0    Lymphocytes Relative  14 - 44 %  24    Monocytes Relative  4 - 12 %  11    Eosinophils Relative  0 - 6 %  2    Basophils Relative  0 - 1 %  0    Neutrophils Absolute  1 85 - 7 62 Thousands/µL  3 39    Immature Grans Absolute  0 00 - 0 20 Thousand/uL  0 02    Lymphocytes Absolute  0 60 - 4 47 Thousands/µL  1 29    Monocytes Absolute  0 17 - 1 22 Thousand/µL  0 57    Eosinophils Absolute  0 00 - 0 61 Thousand/µL  0 10    Basophils Absolute  0 00 - 0 10 Thousands/µL  0 02      - Counseling Documentation: patient was counseled regarding: prognosis

## 2020-09-25 PROBLEM — R53.1 GENERALIZED WEAKNESS: Status: ACTIVE | Noted: 2020-09-25

## 2020-09-29 ENCOUNTER — TELEPHONE (OUTPATIENT)
Dept: NEPHROLOGY | Facility: CLINIC | Age: 85
End: 2020-09-29

## 2020-09-29 NOTE — TELEPHONE ENCOUNTER
KATHY for Laila Mckeon at [facility] to see if patient would be able to have virtual visit today with [facility] at 11:00 a m  Patient is currently scheduled for Friday 10/2 at 9:30 a m

## 2020-10-01 ENCOUNTER — DOCUMENTATION (OUTPATIENT)
Dept: NEPHROLOGY | Facility: CLINIC | Age: 85
End: 2020-10-01

## 2020-10-01 RX ORDER — POTASSIUM CHLORIDE 20 MEQ/1
20 TABLET, EXTENDED RELEASE ORAL DAILY
COMMUNITY

## 2020-10-02 ENCOUNTER — TELEMEDICINE (OUTPATIENT)
Dept: NEPHROLOGY | Facility: CLINIC | Age: 85
End: 2020-10-02
Payer: COMMERCIAL

## 2020-10-02 ENCOUNTER — TELEPHONE (OUTPATIENT)
Dept: NEPHROLOGY | Facility: CLINIC | Age: 85
End: 2020-10-02

## 2020-10-02 VITALS — WEIGHT: 142 LBS | DIASTOLIC BLOOD PRESSURE: 67 MMHG | SYSTOLIC BLOOD PRESSURE: 141 MMHG | BODY MASS INDEX: 25.97 KG/M2

## 2020-10-02 DIAGNOSIS — N17.9 ACUTE KIDNEY INJURY SUPERIMPOSED ON CHRONIC KIDNEY DISEASE (HCC): Primary | ICD-10-CM

## 2020-10-02 DIAGNOSIS — D63.1 ANEMIA DUE TO STAGE 3B CHRONIC KIDNEY DISEASE (HCC): ICD-10-CM

## 2020-10-02 DIAGNOSIS — D50.9 IRON DEFICIENCY ANEMIA, UNSPECIFIED IRON DEFICIENCY ANEMIA TYPE: ICD-10-CM

## 2020-10-02 DIAGNOSIS — N18.32 STAGE 3B CHRONIC KIDNEY DISEASE (HCC): ICD-10-CM

## 2020-10-02 DIAGNOSIS — I50.42 CHRONIC COMBINED SYSTOLIC AND DIASTOLIC CONGESTIVE HEART FAILURE (HCC): Chronic | ICD-10-CM

## 2020-10-02 DIAGNOSIS — N18.32 ANEMIA DUE TO STAGE 3B CHRONIC KIDNEY DISEASE (HCC): ICD-10-CM

## 2020-10-02 DIAGNOSIS — N18.9 ACUTE KIDNEY INJURY SUPERIMPOSED ON CHRONIC KIDNEY DISEASE (HCC): Primary | ICD-10-CM

## 2020-10-02 PROBLEM — N18.30 STAGE 3 CHRONIC KIDNEY DISEASE (HCC): Status: ACTIVE | Noted: 2020-10-02

## 2020-10-02 PROCEDURE — 99214 OFFICE O/P EST MOD 30 MIN: CPT | Performed by: PHYSICIAN ASSISTANT

## 2022-06-26 NOTE — ASSESSMENT & PLAN NOTE
- Last CMP January of 2020   - BMP on 7/14/2020   - BMP on 7/17/2020   - concern for worsening kidney function while on increased dose of lasix
Wt Readings from Last 3 Encounters:   07/13/20 76 2 kg (168 lb)   10/19/16 99 8 kg (220 lb)         - BLE edema 3+  - Continue metoprolol   - Increase furosemide to 60 mg PO BID x 3 days, then return to maintenance dose of 40 mg PO BID    - Add Bumex 1mg PO QD x 3 days then increase to BID  - BMP on 7/14/2020  - BMP on 7/17/2020   - Continue to elevate BLE  - PT not willing to do ACE wraps at this time, states they are uncomfortable  - Continue low sodium diet  - Monitor weights at facility
There are no Wet Read(s) to document.